# Patient Record
Sex: FEMALE | Race: WHITE | NOT HISPANIC OR LATINO | Employment: FULL TIME | URBAN - METROPOLITAN AREA
[De-identification: names, ages, dates, MRNs, and addresses within clinical notes are randomized per-mention and may not be internally consistent; named-entity substitution may affect disease eponyms.]

---

## 2024-05-17 ENCOUNTER — HOSPITAL ENCOUNTER (OUTPATIENT)
Dept: RADIOLOGY | Facility: CLINIC | Age: 59
Discharge: HOME | End: 2024-05-17
Payer: COMMERCIAL

## 2024-05-17 DIAGNOSIS — F17.210 NICOTINE DEPENDENCE, CIGARETTES, UNCOMPLICATED: ICD-10-CM

## 2024-05-17 PROCEDURE — 71271 CT THORAX LUNG CANCER SCR C-: CPT

## 2024-05-28 ENCOUNTER — TELEPHONE (OUTPATIENT)
Dept: GASTROENTEROLOGY | Facility: CLINIC | Age: 59
End: 2024-05-28
Payer: COMMERCIAL

## 2024-05-28 NOTE — TELEPHONE ENCOUNTER
OA [facility]ON 6/17/24  ENDO  DOCTOR KASANDRA SHAH PREP  PACKET MAILED AND SENT VIA [facility] 5/28/24

## 2024-05-28 NOTE — TELEPHONE ENCOUNTER
----- Message from Saundra Lees MA sent at 5/28/2024 10:42 AM EDT -----  Regarding: RE: OPEN ACCESS  Left message on machine to return call     ----- Message -----  From: Ramandeep Zhang RN  Sent: 5/24/2024   2:26 PM EDT  To: [id number]er[facility]  Subject: OPEN ACCESS                                      [facility]

## 2024-06-17 ENCOUNTER — ANESTHESIA (OUTPATIENT)
Dept: GASTROENTEROLOGY | Facility: HOSPITAL | Age: 59
End: 2024-06-17
Payer: COMMERCIAL

## 2024-06-17 ENCOUNTER — ANESTHESIA EVENT (OUTPATIENT)
Dept: GASTROENTEROLOGY | Facility: HOSPITAL | Age: 59
End: 2024-06-17
Payer: COMMERCIAL

## 2024-06-17 ENCOUNTER — HOSPITAL ENCOUNTER (OUTPATIENT)
Dept: GASTROENTEROLOGY | Facility: HOSPITAL | Age: 59
Discharge: HOME | End: 2024-06-17
Payer: COMMERCIAL

## 2024-06-17 VITALS
DIASTOLIC BLOOD PRESSURE: 84 MMHG | BODY MASS INDEX: 22.82 KG/M2 | SYSTOLIC BLOOD PRESSURE: 130 MMHG | WEIGHT: 137 LBS | TEMPERATURE: 97.5 F | HEIGHT: 65 IN | HEART RATE: 64 BPM | OXYGEN SATURATION: 97 % | RESPIRATION RATE: 16 BRPM

## 2024-06-17 DIAGNOSIS — Z12.11 SCREENING FOR COLON CANCER: ICD-10-CM

## 2024-06-17 PROBLEM — N20.1 LEFT URETERAL CALCULUS: Status: ACTIVE | Noted: 2024-06-17

## 2024-06-17 PROCEDURE — 7100000010 HC PHASE TWO TIME - EACH INCREMENTAL 1 MINUTE

## 2024-06-17 PROCEDURE — 2500000004 HC RX 250 GENERAL PHARMACY W/ HCPCS (ALT 636 FOR OP/ED): Performed by: NURSE ANESTHETIST, CERTIFIED REGISTERED

## 2024-06-17 PROCEDURE — G0121 COLON CA SCRN NOT HI RSK IND: HCPCS | Performed by: INTERNAL MEDICINE

## 2024-06-17 PROCEDURE — 3700000001 HC GENERAL ANESTHESIA TIME - INITIAL BASE CHARGE

## 2024-06-17 PROCEDURE — 7100000009 HC PHASE TWO TIME - INITIAL BASE CHARGE

## 2024-06-17 PROCEDURE — 3700000002 HC GENERAL ANESTHESIA TIME - EACH INCREMENTAL 1 MINUTE

## 2024-06-17 PROCEDURE — 45378 DIAGNOSTIC COLONOSCOPY: CPT | Performed by: INTERNAL MEDICINE

## 2024-06-17 PROCEDURE — 2500000005 HC RX 250 GENERAL PHARMACY W/O HCPCS: Performed by: NURSE ANESTHETIST, CERTIFIED REGISTERED

## 2024-06-17 PROCEDURE — 2500000004 HC RX 250 GENERAL PHARMACY W/ HCPCS (ALT 636 FOR OP/ED): Performed by: INTERNAL MEDICINE

## 2024-06-17 RX ORDER — CYANOCOBALAMIN (VITAMIN B-12) 250 MCG
250 TABLET ORAL
COMMUNITY
Start: 2024-06-03

## 2024-06-17 RX ORDER — FERROUS SULFATE 325(65) MG
325 TABLET ORAL
COMMUNITY
Start: 2024-06-03

## 2024-06-17 RX ORDER — SODIUM CHLORIDE 9 MG/ML
20 INJECTION, SOLUTION INTRAVENOUS CONTINUOUS
Status: DISCONTINUED | OUTPATIENT
Start: 2024-06-17 | End: 2024-06-18 | Stop reason: HOSPADM

## 2024-06-17 RX ORDER — POLYETHYLENE GLYCOL 3350 17 G/17G
17 POWDER, FOR SOLUTION ORAL DAILY
Qty: 60 PACKET | Refills: 3 | Status: SHIPPED | OUTPATIENT
Start: 2024-06-17

## 2024-06-17 RX ORDER — ATORVASTATIN CALCIUM 20 MG/1
20 TABLET, FILM COATED ORAL DAILY
COMMUNITY

## 2024-06-17 RX ORDER — PROPOFOL 10 MG/ML
INJECTION, EMULSION INTRAVENOUS AS NEEDED
Status: DISCONTINUED | OUTPATIENT
Start: 2024-06-17 | End: 2024-06-17

## 2024-06-17 RX ORDER — VIT C/E/ZN/COPPR/LUTEIN/ZEAXAN 250MG-90MG
1000 CAPSULE ORAL
COMMUNITY
Start: 2024-06-03

## 2024-06-17 RX ORDER — ALBUTEROL SULFATE 90 UG/1
AEROSOL, METERED RESPIRATORY (INHALATION)
COMMUNITY
Start: 2023-12-18 | End: 2024-06-25 | Stop reason: WASHOUT

## 2024-06-17 RX ORDER — LIDOCAINE HYDROCHLORIDE 20 MG/ML
INJECTION, SOLUTION INFILTRATION; PERINEURAL AS NEEDED
Status: DISCONTINUED | OUTPATIENT
Start: 2024-06-17 | End: 2024-06-17

## 2024-06-17 SDOH — HEALTH STABILITY: MENTAL HEALTH: CURRENT SMOKER: 1

## 2024-06-17 ASSESSMENT — COLUMBIA-SUICIDE SEVERITY RATING SCALE - C-SSRS
1. IN THE PAST MONTH, HAVE YOU WISHED YOU WERE DEAD OR WISHED YOU COULD GO TO SLEEP AND NOT WAKE UP?: NO
2. HAVE YOU ACTUALLY HAD ANY THOUGHTS OF KILLING YOURSELF?: NO
6. HAVE YOU EVER DONE ANYTHING, STARTED TO DO ANYTHING, OR PREPARED TO DO ANYTHING TO END YOUR LIFE?: NO

## 2024-06-17 ASSESSMENT — PAIN SCALES - GENERAL
PAINLEVEL_OUTOF10: 0 - NO PAIN
PAIN_LEVEL: 0
PAINLEVEL_OUTOF10: 0 - NO PAIN

## 2024-06-17 ASSESSMENT — PAIN - FUNCTIONAL ASSESSMENT
PAIN_FUNCTIONAL_ASSESSMENT: 0-10

## 2024-06-17 NOTE — ANESTHESIA PREPROCEDURE EVALUATION
Patient: Talia Wilcox    Procedure Information       Date/Time: 06/17/24 1330    Scheduled providers: Crhistopher Aguilar DO    Procedure: COLONOSCOPY    Location: [facility]            Relevant Problems   Anesthesia (within normal limits)       Clinical information reviewed:   Tobacco  Allergies  Meds   Med Hx  Surg Hx  OB Status  Fam Hx  Soc   Hx        NPO Detail:  NPO/Void Status  Date of Last Liquid: 06/17/24  Time of Last Liquid: 0730  Date of Last Solid: 06/15/24  Time of Last Solid: 2100  Last Intake Type: Clear fluids         Physical Exam    Airway  Mallampati: II  TM distance: >3 FB  Neck ROM: full     Cardiovascular - normal exam     Dental   (+) upper dentures, lower dentures     Pulmonary - normal exam     Abdominal - normal exam             Anesthesia Plan    History of general anesthesia?: yes  History of complications of general anesthesia?: no    ASA 2     MAC     The patient is a current smoker.  Patient was previously instructed to abstain from smoking on day of procedure.  Patient smoked on day of procedure.    intravenous induction   Anesthetic plan and risks discussed with patient.    Plan discussed with CRNA.

## 2024-06-17 NOTE — ANESTHESIA POSTPROCEDURE EVALUATION
Patient: Talia Wilcox    Procedure Summary       Date: 06/17/24 Room / Location: [facility]    Anesthesia Start: 1305 Anesthesia Stop: 1339    Procedure: COLONOSCOPY Diagnosis: Screening for colon cancer    Scheduled Providers: Christopher Aguilar DO Responsible Provider: SIGIFREDO Phillip    Anesthesia Type: MAC ASA Status: 2            Anesthesia Type: MAC    Vitals Value Taken Time   /63 06/17/24 1337   Temp 36.1 °C (97 °F) 06/17/24 1337   Pulse 77 06/17/24 1337   Resp 16 06/17/24 1337   SpO2 98 % 06/17/24 1337       Anesthesia Post Evaluation    Patient location during evaluation: PACU  Patient participation: complete - patient participated  Level of consciousness: awake and alert  Pain score: 0  Pain management: adequate  Airway patency: patent  Cardiovascular status: acceptable and hemodynamically stable  Respiratory status: acceptable, spontaneous ventilation and nasal cannula  Hydration status: acceptable  Postoperative Nausea and Vomiting: none        There were no known notable events for this encounter.

## 2024-06-18 ENCOUNTER — APPOINTMENT (OUTPATIENT)
Dept: CARDIOLOGY | Facility: CLINIC | Age: 59
End: 2024-06-18
Payer: COMMERCIAL

## 2024-06-25 ENCOUNTER — APPOINTMENT (OUTPATIENT)
Dept: CARDIOLOGY | Facility: CLINIC | Age: 59
End: 2024-06-25
Payer: COMMERCIAL

## 2024-06-25 VITALS
BODY MASS INDEX: 23.07 KG/M2 | HEIGHT: 65 IN | WEIGHT: 138.5 LBS | DIASTOLIC BLOOD PRESSURE: 84 MMHG | SYSTOLIC BLOOD PRESSURE: 142 MMHG | OXYGEN SATURATION: 99 % | HEART RATE: 83 BPM

## 2024-06-25 DIAGNOSIS — I25.10 CORONARY ARTERY CALCIFICATION: ICD-10-CM

## 2024-06-25 DIAGNOSIS — R03.0 BLOOD PRESSURE ELEVATED WITHOUT HISTORY OF HTN: ICD-10-CM

## 2024-06-25 DIAGNOSIS — R00.2 PALPITATIONS: Primary | ICD-10-CM

## 2024-06-25 DIAGNOSIS — I25.84 CORONARY ARTERY CALCIFICATION: ICD-10-CM

## 2024-06-25 DIAGNOSIS — E78.2 MIXED HYPERLIPIDEMIA: ICD-10-CM

## 2024-06-25 PROBLEM — K50.90: Status: ACTIVE | Noted: 2024-06-24

## 2024-06-25 PROCEDURE — 93000 ELECTROCARDIOGRAM COMPLETE: CPT | Performed by: STUDENT IN AN ORGANIZED HEALTH CARE EDUCATION/TRAINING PROGRAM

## 2024-06-25 PROCEDURE — 99205 OFFICE O/P NEW HI 60 MIN: CPT | Performed by: STUDENT IN AN ORGANIZED HEALTH CARE EDUCATION/TRAINING PROGRAM

## 2024-06-25 RX ORDER — NAPROXEN SODIUM 220 MG/1
81 TABLET, FILM COATED ORAL DAILY
Qty: 30 TABLET | Refills: 11 | Status: SHIPPED | OUTPATIENT
Start: 2024-06-25 | End: 2025-06-25

## 2024-06-25 RX ORDER — ATORVASTATIN CALCIUM 40 MG/1
40 TABLET, FILM COATED ORAL DAILY
Qty: 30 TABLET | Refills: 11 | Status: SHIPPED | OUTPATIENT
Start: 2024-06-25 | End: 2025-06-25

## 2024-06-25 NOTE — PROGRESS NOTES
[facility] Card[facility] Clinic Note    Date: 06/25/24  Time: 11:40 PM    Subjective   Talia Wilcox is a 58 y.o. female who is referred to me for establishing care.  Patient has a history of hyperlipidemia, prediabetes, abnormal coronary artery calcification.  Patient is reporting some palpitations.  CT chest showed severe coronary artery calcification.  Patient was referred due to palpitation.  Patient had CT scan done for screening of lung cancer which showed severe coronary artery calcification.  Patient has no chest pain or dyspnea.  Patient denies any heart failure symptoms.  Her only complaint today is recurrence of intermittent palpitation which have been happening for last few months.  Palpation revealed last for few hours.  No aggravating or relieving factor.        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     Review of Systems:  Otherwise, limited cardiovascular review of systems is negative.        Medical History:   She has a past medical history of Crohn disease (Multi).  Surgical History:   History reviewed. No pertinent surgical history.PSHP@  Social History:   Social Determinants of Health with Concerns     Tobacco Use: High Risk (6/25/2024)    Patient History     Smoking Tobacco Use: Every Day     Smokeless Tobacco Use: Never     Passive Exposure: Not on file   Alcohol Use: Not on file   Financial Resource Strain: Not on file   Food Insecurity: Not on file   Transportation Needs: Not on file   Physical Activity: Not on file   Stress: Not on file   Social Connections: Not on file   Intimate Partner Violence: Not on file   Depression: Medium Risk (4/19/2024)    Received from [facility]    Depression     PHQ-9 Total Score (Interpretation of Total Score 1-4 = Minimal depression; 5-9 = Mild depression; 10-14 =  "Moderate depression; 15-19 = Moderately severe depression): 11   Housing Stability: Not on file   Utilities: Not on file   Digital Equity: Not on file   Health Literacy: Not on file     Family History:   Family History   Problem Relation Name Age of Onset    Heart attack Father Richard Givens       Allergies:  Ciprofloxacin    Outpatient Medications:  Current Outpatient Medications   Medication Instructions    aspirin 81 mg, oral, Daily    atorvastatin (LIPITOR) 40 mg, oral, Daily    cholecalciferol (VITAMIN D-3) 1,000 Units, oral, Daily RT    cyanocobalamin (VITAMIN B-12) 250 mcg, oral, Daily RT    ferrous sulfate (325 mg ferrous sulfate) 325 mg, oral, Daily RT    polyethylene glycol (GLYCOLAX, MIRALAX) 17 g, oral, Daily       Objective     Physical Exam  Vitals:    06/25/24 1259   BP: 142/84   BP Location: Left arm   Patient Position: Sitting   BP Cuff Size: Adult   Pulse: 83   SpO2: 99%   Weight: 62.8 kg (138 lb 8 oz)   Height: 1.651 m (5' 5\")     Wt Readings from Last 3 Encounters:   06/25/24 62.8 kg (138 lb 8 oz)   06/17/24 62.1 kg (137 lb)       General: Alert and Oriented, No distress, cooperative  Head: Normocephalic without obvious abnormality, atraumatic  Eyes: Conjunctiva/corneas clear, EOM's grossly intact  Neck: Supple, trachea midline, No thyroid enlargement/tenderness/nodules; No JVD  Lungs: Clear to auscultation bilaterally, no wheezes, rhonci, or rales. respirations unlabored  Chest Wall: No tenderness or deformity  Heart: Regular rhythm, normal S1/S2, no murmur  Abdomen: Soft, non-tender, Non-distended, bowel sounds active  Extremities: No edema, no cyanosis, no clubbing  Skin: Skin color, texture, turgor normal.  No rashes or lesions noted  Neurologic: Alert and oriented x 3, grossly moving all extremities, speech intact        I have personally reviewed the following images and laboratory findings:  ECG: normal sinus rhythm, no blocks or conduction defects, no ischemic " changes    INDICATION:  Signs/Symptoms:..      COMPARISON:  12/16/2015      ACCESSION NUMBER(S):  [id number]      ORDERING CLINICIAN:  JEFF MCNEAL      TECHNIQUE:  Helical data acquisition of the chest was obtained without IV  contrast material.  Images were reformatted in axial, coronal, and  sagittal planes.      FINDINGS:  LUNGS AND AIRWAYS:  The trachea and central airways are patent. No endobronchial lesion  is seen.      There is mild paraseptal emphysematous changes.  There is prominent biapical pleural thickening with nodularity.  There is no focal consolidation, pleural effusion, or pneumothorax.      There is a 7 mm nodular density in the left upper lobe with adjacent  ground-glass opacities. 3 mm subpleural right apical parenchymal lung  nodule. There is a 3 mm subpleural left upper lobe parenchymal lung  nodule. There is a 2 mm subpleural right lower lobe parenchymal lung  nodule. Punctate 2 mm right middle lobe parenchymal lung nodules.                  MEDIASTINUM AND ANTWAN, LOWER NECK AND AXILLA:  The visualized thyroid gland is within normal limits.  No evidence of thoracic lymphadenopathy by CT criteria.  Esophagus appears within normal limits as seen.      HEART AND VESSELS:  The thoracic aorta normal in course and caliber.  Main pulmonary artery and its branches are normal in caliber.  There are severe coronary artery calcifications. Estimated CT calcium  score: 583. The cardiac chambers are not enlarged.  There is no pericardial effusion seen.      UPPER ABDOMEN:  Patient is status post cholecystectomy.              CHEST WALL AND OSSEOUS STRUCTURES:  Chest wall is within normal limits.  No acute osseous pathology.There are no suspicious osseous lesions.      IMPRESSION:  1. There are several subcentimeter parenchymal lung nodules most  likely benign in nature.  2. Recommend a six-month low-dose chest CT confirm the benign nature  of these subcentimeter nodules.  3. Severe coronary artery  calcifications and correlate with coronary  artery disease risk factors.      Echocardiogram: not done    Laboratory values:   No visits with results within 2 Month(s) from this visit.   Latest known visit with results is:   Legacy Encounter on 02/05/2022   Component Date Value    WBC, Urine 02/05/2022 <1     RBC, Urine 02/05/2022 2     Glucose 02/06/2022 120 (H)     Sodium 02/06/2022 137     Potassium 02/06/2022 3.8     Chloride 02/06/2022 104     Bicarbonate 02/06/2022 26     Anion Gap 02/06/2022 11     Urea Nitrogen 02/06/2022 10     Creatinine 02/06/2022 0.97     GFR Female 02/06/2022 68     Calcium 02/06/2022 8.8     Phosphorus 02/06/2022 4.1     Albumin 02/06/2022 3.7     Glucose 02/05/2022 151 (H)     Sodium 02/05/2022 137     Potassium 02/05/2022 3.6     Chloride 02/05/2022 104     Bicarbonate 02/05/2022 24     Anion Gap 02/05/2022 13     Urea Nitrogen 02/05/2022 11     Creatinine 02/05/2022 0.91     GFR Female 02/05/2022 74     Calcium 02/05/2022 9.5     WBC 02/05/2022 12.1 (H)     RBC 02/05/2022 4.36     Hemoglobin 02/05/2022 13.4     Hematocrit 02/05/2022 40.5     MCV 02/05/2022 93     MCHC 02/05/2022 33.1     Platelets 02/05/2022 279     RDW 02/05/2022 14.8 (H)     Neutrophils % 02/05/2022 85.5     Immature Granulocytes %,* 02/05/2022 0.2     Lymphocytes % 02/05/2022 11.1     Monocytes % 02/05/2022 2.7     Eosinophils % 02/05/2022 0.2     Basophils % 02/05/2022 0.3     Neutrophils Absolute 02/05/2022 10.31 (H)     Lymphocytes Absolute 02/05/2022 1.34     Monocytes Absolute 02/05/2022 0.33     Eosinophils Absolute 02/05/2022 0.02     Basophils Absolute 02/05/2022 0.04     WBC 02/06/2022 10.1     RBC 02/06/2022 3.80 (L)     Hemoglobin 02/06/2022 11.9 (L)     Hematocrit 02/06/2022 35.7 (L)     MCV 02/06/2022 94     MCHC 02/06/2022 33.3     Platelets 02/06/2022 244     RDW 02/06/2022 14.9 (H)     Color, Urine 02/05/2022 Yellow     Appearance, Urine 02/05/2022 Clear     Specific Gravity, Urine 02/05/2022  "1.036 (H)     pH, Urine 02/05/2022 6.0     Protein, Urine 02/05/2022 Negative     Glucose, Urine 02/05/2022 Negative     Blood, Urine 02/05/2022 SMALL(1+) (A)     Ketones, Urine 02/05/2022 20(1+) (A)     Bilirubin, Urine 02/05/2022 Negative     Urobilinogen, Urine 02/05/2022 <2.0     Nitrite, Urine 02/05/2022 Negative     Leukocyte Esterase, Urine 02/05/2022 Negative     Lipase 02/05/2022 27     Magnesium 02/06/2022 1.68     Albumin 02/05/2022 4.3     Total Bilirubin 02/05/2022 0.3     Bilirubin, Direct 02/05/2022 0.1     Alkaline Phosphatase 02/05/2022 83     ALT (SGPT) 02/05/2022 16     AST 02/05/2022 16     Total Protein 02/05/2022 7.3     SARS-CoV-2 Result 02/05/2022 NOT DETECTED     Sedimentation Rate 02/05/2022 28     CRP 02/05/2022 0.30      CBC -  Lab Results   Component Value Date    WBC 10.1 02/06/2022    HGB 11.9 (L) 02/06/2022    HCT 35.7 (L) 02/06/2022    MCV 94 02/06/2022     02/06/2022       CMP -  Lab Results   Component Value Date    CALCIUM 8.8 02/06/2022    PHOS 4.1 02/06/2022    PROT 7.3 02/05/2022    ALBUMIN 3.7 02/06/2022    AST 16 02/05/2022    ALT 16 02/05/2022    ALKPHOS 83 02/05/2022    BILITOT 0.3 02/05/2022       LIPID PANEL -   No results found for: \"CHOL\", \"HDL\", \"CHHDL\", \"LDL\", \"VLDL\", \"TRIG\", \"NHDL\"    RENAL FUNCTION PANEL -   Lab Results   Component Value Date    K 3.8 02/06/2022    PHOS 4.1 02/06/2022     The ASCVD Risk score (Luther DK, et al., 2019) failed to calculate for the following reasons:    Cannot find a previous HDL lab    Cannot find a previous total cholesterol lab    No results found for: \"BNP\", \"HGBA1C\"      Assessment/Plan   Palpitation  Hyperlipidemia  Abnormal coronary artery calcification    Plan:  -I will obtain transthoracic echo to rule out any structural abnormalities.  -I will also obtain Holter monitor to evaluate for malignant arrhythmia.  -I will start on aspirin 81 mg daily and recommend high intensity statin therapy.  -Withhold on stress testing " given patient is completely asymptomatic.  RTC as scheduled.    In addition, the following orders were placed today:  Orders Placed This Encounter   Procedures    Holter Or Event Cardiac Monitor    ECG 12 Lead    Transthoracic Echo (TTE) Complete                 SIGNATURE: Reji Hopper MD PATIENT NAME: Talia Wilcox   DATE/TIME: June 25, 2024 11:40 PM MRN: [id number]

## 2024-07-16 ENCOUNTER — APPOINTMENT (OUTPATIENT)
Dept: CARDIOLOGY | Facility: HOSPITAL | Age: 59
End: 2024-07-16
Payer: COMMERCIAL

## 2024-07-23 ENCOUNTER — HOSPITAL ENCOUNTER (OUTPATIENT)
Dept: CARDIOLOGY | Facility: HOSPITAL | Age: 59
Discharge: HOME | End: 2024-07-23
Payer: COMMERCIAL

## 2024-07-23 ENCOUNTER — APPOINTMENT (OUTPATIENT)
Dept: CARDIOLOGY | Facility: CLINIC | Age: 59
End: 2024-07-23
Payer: COMMERCIAL

## 2024-07-23 DIAGNOSIS — I25.10 CORONARY ARTERY CALCIFICATION: ICD-10-CM

## 2024-07-23 DIAGNOSIS — I25.84 CORONARY ARTERY CALCIFICATION: ICD-10-CM

## 2024-07-23 DIAGNOSIS — R00.2 PALPITATIONS: ICD-10-CM

## 2024-07-23 DIAGNOSIS — I10 ESSENTIAL (PRIMARY) HYPERTENSION: ICD-10-CM

## 2024-07-23 LAB
AORTIC VALVE MEAN GRADIENT: 1.9 MMHG
AORTIC VALVE PEAK VELOCITY: 0.92 M/S
AV PEAK GRADIENT: 3.4 MMHG
AVA (PEAK VEL): 3.14 CM2
AVA (VTI): 3.1 CM2
EJECTION FRACTION: 56 %
GLOBAL LONGITUDINAL STRAIN: 18.2 %
LEFT ATRIUM VOLUME AREA LENGTH INDEX BSA: 12.5 ML/M2
LEFT VENTRICLE INTERNAL DIMENSION DIASTOLE: 3.99 CM (ref 3.5–6)
LEFT VENTRICULAR OUTFLOW TRACT DIAMETER: 1.99 CM
MITRAL VALVE E/A RATIO: 0.87
MITRAL VALVE E/E' RATIO: 7.57
RIGHT VENTRICLE FREE WALL PEAK S': 12.53 CM/S
TRICUSPID ANNULAR PLANE SYSTOLIC EXCURSION: 2 CM

## 2024-07-23 PROCEDURE — 93306 TTE W/DOPPLER COMPLETE: CPT | Performed by: INTERNAL MEDICINE

## 2024-07-23 PROCEDURE — 93306 TTE W/DOPPLER COMPLETE: CPT

## 2024-07-23 PROCEDURE — 93246 EXT ECG>7D<15D RECORDING: CPT | Performed by: STUDENT IN AN ORGANIZED HEALTH CARE EDUCATION/TRAINING PROGRAM

## 2024-07-26 ENCOUNTER — TELEPHONE (OUTPATIENT)
Dept: CARDIOLOGY | Facility: CLINIC | Age: 59
End: 2024-07-26
Payer: COMMERCIAL

## 2024-07-26 NOTE — TELEPHONE ENCOUNTER
7/26/24  1310  Called patient; no answer. Left brief voice message informing of echocardiogram results, to keep follow up with Dr. Hopper and to call if questions.      ----- Message from Reji Hopper sent at 7/23/2024  5:17 PM EDT -----  routine  ----- Message -----  From: Jens Syngo - Cardiology Results In  Sent: 7/23/2024   4:15 PM EDT  To: Reji Hopper MD

## 2024-07-26 NOTE — TELEPHONE ENCOUNTER
7/26/24  0438  Patient called back in for results.      Echocardiogram results given to patient with patient verbalizing understanding.    Patient did ask if the echocardiogram showed any calcification of the coronary arteries; she was informed she had coronary calcification on a chest x-ray and does have intermittent chest pain at work when standing.    Informed patient that the echocardiogram does not do that but that a stress test or a CT angio will help to determine calcification.  Patient reported to have Holter monitor on at this time and scheduled a follow up with Dr. Hopper.    Informed patient that depending on her insurance that Dr. Hopper may order either a CT angio or a stress test to determine if there is ischemia.    Patient verbalized understanding.

## 2024-08-14 ENCOUNTER — ANCILLARY PROCEDURE (OUTPATIENT)
Dept: CARDIOLOGY | Facility: HOSPITAL | Age: 59
End: 2024-08-14
Payer: COMMERCIAL

## 2024-08-14 DIAGNOSIS — R00.2 PALPITATIONS: ICD-10-CM

## 2024-08-14 PROCEDURE — 93248 EXT ECG>7D<15D REV&INTERPJ: CPT | Performed by: STUDENT IN AN ORGANIZED HEALTH CARE EDUCATION/TRAINING PROGRAM

## 2024-08-14 PROCEDURE — 93246 EXT ECG>7D<15D RECORDING: CPT

## 2024-08-19 ENCOUNTER — TELEPHONE (OUTPATIENT)
Dept: CARDIOLOGY | Facility: HOSPITAL | Age: 59
End: 2024-08-19
Payer: COMMERCIAL

## 2024-08-19 NOTE — TELEPHONE ENCOUNTER
18/19/24  1014  Patient returned call. Informed patient of of Holter results and that Dr. Hopper would discuss treatment options at follow up this week.    Patient verbalized understanding    8/19/24  1008  Called patient; no answer. Left voice message for patient to return call for Holter results.      ----- Message from Reji Hopper sent at 8/17/2024  9:06 PM EDT -----  Patient had svt which is symatic. F/up with me I see schedule on 8/22 which is okay  ----- Message -----  From: Rasheeda Juan  Sent: 8/15/2024   3:43 PM EDT  To: Reji Hopper MD

## 2024-08-21 ENCOUNTER — TELEPHONE (OUTPATIENT)
Dept: CARDIOLOGY | Facility: HOSPITAL | Age: 59
End: 2024-08-21
Payer: COMMERCIAL

## 2024-08-22 ENCOUNTER — APPOINTMENT (OUTPATIENT)
Dept: CARDIOLOGY | Facility: CLINIC | Age: 59
End: 2024-08-22
Payer: COMMERCIAL

## 2024-08-22 VITALS
HEART RATE: 87 BPM | SYSTOLIC BLOOD PRESSURE: 122 MMHG | WEIGHT: 138.4 LBS | HEIGHT: 65 IN | OXYGEN SATURATION: 97 % | BODY MASS INDEX: 23.06 KG/M2 | DIASTOLIC BLOOD PRESSURE: 62 MMHG

## 2024-08-22 DIAGNOSIS — I47.10 SVT (SUPRAVENTRICULAR TACHYCARDIA) (CMS-HCC): Primary | ICD-10-CM

## 2024-08-22 DIAGNOSIS — I25.10 CORONARY ARTERY CALCIFICATION: ICD-10-CM

## 2024-08-22 DIAGNOSIS — I20.89 ATYPICAL ANGINA (CMS-HCC): ICD-10-CM

## 2024-08-22 DIAGNOSIS — I10 ESSENTIAL (PRIMARY) HYPERTENSION: ICD-10-CM

## 2024-08-22 DIAGNOSIS — I25.84 CORONARY ARTERY CALCIFICATION: ICD-10-CM

## 2024-08-22 DIAGNOSIS — E78.2 MIXED HYPERLIPIDEMIA: ICD-10-CM

## 2024-08-22 PROCEDURE — 99215 OFFICE O/P EST HI 40 MIN: CPT | Performed by: STUDENT IN AN ORGANIZED HEALTH CARE EDUCATION/TRAINING PROGRAM

## 2024-08-22 PROCEDURE — 3074F SYST BP LT 130 MM HG: CPT | Performed by: STUDENT IN AN ORGANIZED HEALTH CARE EDUCATION/TRAINING PROGRAM

## 2024-08-22 PROCEDURE — 3078F DIAST BP <80 MM HG: CPT | Performed by: STUDENT IN AN ORGANIZED HEALTH CARE EDUCATION/TRAINING PROGRAM

## 2024-08-22 PROCEDURE — 3008F BODY MASS INDEX DOCD: CPT | Performed by: STUDENT IN AN ORGANIZED HEALTH CARE EDUCATION/TRAINING PROGRAM

## 2024-08-22 RX ORDER — NAPROXEN SODIUM 220 MG/1
81 TABLET, FILM COATED ORAL DAILY
Qty: 30 TABLET | Refills: 11 | Status: SHIPPED | OUTPATIENT
Start: 2024-08-22 | End: 2025-08-22

## 2024-08-22 RX ORDER — ATORVASTATIN CALCIUM 40 MG/1
40 TABLET, FILM COATED ORAL DAILY
Qty: 30 TABLET | Refills: 11 | Status: SHIPPED | OUTPATIENT
Start: 2024-08-22 | End: 2025-08-22

## 2024-08-22 RX ORDER — METOPROLOL TARTRATE 25 MG/1
25 TABLET, FILM COATED ORAL 2 TIMES DAILY
Qty: 60 TABLET | Refills: 11 | Status: SHIPPED | OUTPATIENT
Start: 2024-08-22 | End: 2025-08-22

## 2024-08-22 NOTE — PROGRESS NOTES
[facility] Clinic Note    Date: 08/22/24  Time: 4:55 PM    Subjective   Talia Wilcox is a 58 y.o. female who is coming to clinic for follow-up visit..  Patient has a history of hyperlipidemia, prediabetes, abnormal coronary artery calcification.  Patient is reporting some palpitations.  CT chest showed severe coronary artery calcification.  Patient was referred due to palpitation.  Patient had CT scan done for screening of lung cancer which showed severe coronary artery calcification. Patient denies any heart failure symptoms.  She has been having palpitations.  She underwent Holter monitoring which showed some SVT and supraventricular ectopy some of which corresponded with patient's symptoms.     She also endorses some chest tightness.  She continues to smoke.  She has not been worked up for COPD.  CT scan did show emphysema.  Does not follow-up with pulmonary.      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        Review of Systems:  Otherwise, limited cardiovascular review of systems is negative.        Medical History:   She has a past medical history of Crohn disease (Multi).  Surgical History:   No past surgical history on file.PSHP@  Social History:   Social Determinants of Health with Concerns     Tobacco Use: High Risk (8/22/2024)    Patient History     Smoking Tobacco Use: Every Day     Smokeless Tobacco Use: Never     Passive Exposure: Not on file   Alcohol Use: Not on file   Financial Resource Strain: Not on file   Food Insecurity: Not on file   Transportation Needs: Not on file   Physical Activity: Not on file   Stress: Not on file   Social Connections: Not on file   Intimate Partner Violence: Not on file   Depression: Medium Risk (4/19/2024)    Received from [facility]  [facility]    Depression     PHQ-9 Total Score (Interpretation of Total Score 1-4 = Minimal depression; 5-9 = Mild depression; 10-14 = Moderate depression; 15-19 = Moderately severe depression): 11   Housing Stability: Not on file   Utilities: Not on file   Digital Equity: Not on file   Health Literacy: Not on file     Family History:   Family History   Problem Relation Name Age of Onset    Heart attack Father Richard Givens       Allergies:  Ciprofloxacin    Outpatient Medications:  Current Outpatient Medications   Medication Instructions    aspirin 81 mg, oral, Daily    atorvastatin (LIPITOR) 40 mg, oral, Daily    cholecalciferol (VITAMIN D-3) 1,000 Units, oral, Daily RT    cyanocobalamin (VITAMIN B-12) 250 mcg, oral, Daily RT    metoprolol tartrate (LOPRESSOR) 25 mg, oral, 2 times daily       Objective     Physical Exam  Vitals:    08/22/24 1445   BP: 122/62   BP Location: Left arm   Patient Position: Sitting   BP Cuff Size: Adult   Pulse: 87   SpO2: 97%   Weight: 62.8 kg (138 lb 6.4 oz)   Height: 1.651 m (5' 5\")     Wt Readings from Last 3 Encounters:   08/22/24 62.8 kg (138 lb 6.4 oz)   06/25/24 62.8 kg (138 lb 8 oz)   06/17/24 62.1 kg (137 lb)       General: Alert and Oriented, No distress, cooperative  Head: Normocephalic without obvious abnormality, atraumatic  Eyes: Conjunctiva/corneas clear, EOM's grossly intact  Neck: Supple, trachea midline, No thyroid enlargement/tenderness/nodules; No JVD  Lungs: Clear to auscultation bilaterally, no wheezes, rhonci, or rales. respirations unlabored  Chest Wall: No tenderness or deformity  Heart: Regular rhythm, normal S1/S2, no murmur  Abdomen: Soft, non-tender, Non-distended, bowel sounds active  Extremities: No edema, no cyanosis, no clubbing  Skin: Skin color, texture, turgor normal.  No rashes or lesions noted  Neurologic: Alert and oriented x 3, grossly moving all extremities, speech intact      I have personally reviewed the following images and " laboratory findings:  ECG: normal sinus rhythm, no blocks or conduction defects, no ischemic changes     INDICATION:  Signs/Symptoms:..      COMPARISON:  12/16/2015      ACCESSION NUMBER(S):  [id number]      ORDERING CLINICIAN:  JEFF MCNEAL      TECHNIQUE:  Helical data acquisition of the chest was obtained without IV  contrast material.  Images were reformatted in axial, coronal, and  sagittal planes.      FINDINGS:  LUNGS AND AIRWAYS:  The trachea and central airways are patent. No endobronchial lesion  is seen.      There is mild paraseptal emphysematous changes.  There is prominent biapical pleural thickening with nodularity.  There is no focal consolidation, pleural effusion, or pneumothorax.      There is a 7 mm nodular density in the left upper lobe with adjacent  ground-glass opacities. 3 mm subpleural right apical parenchymal lung  nodule. There is a 3 mm subpleural left upper lobe parenchymal lung  nodule. There is a 2 mm subpleural right lower lobe parenchymal lung  nodule. Punctate 2 mm right middle lobe parenchymal lung nodules.                  MEDIASTINUM AND ANTWAN, LOWER NECK AND AXILLA:  The visualized thyroid gland is within normal limits.  No evidence of thoracic lymphadenopathy by CT criteria.  Esophagus appears within normal limits as seen.      HEART AND VESSELS:  The thoracic aorta normal in course and caliber.  Main pulmonary artery and its branches are normal in caliber.  There are severe coronary artery calcifications. Estimated CT calcium  score: 583. The cardiac chambers are not enlarged.  There is no pericardial effusion seen.      UPPER ABDOMEN:  Patient is status post cholecystectomy.              CHEST WALL AND OSSEOUS STRUCTURES:  Chest wall is within normal limits.  No acute osseous pathology.There are no suspicious osseous lesions.      IMPRESSION:  1. There are several subcentimeter parenchymal lung nodules most  likely benign in nature.  2. Recommend a six-month low-dose  chest CT confirm the benign nature  of these subcentimeter nodules.  3. Severe coronary artery calcifications and correlate with coronary  artery disease risk factors.  TRANSTHORACIC ECHOCARDIOGRAM REPORT     Patient Name:      ABRAHAMCHAR Alcantar Physician:    [id number] Kim Nelson MD  Study Date:        7/23/2024            Ordering Provider:    [id number] YULIANA MITCHELL  MRN/PID:           [id number]             Fellow:  Accession#:        [id number]         Nurse:  Date of Birth/Age: 1965 / 58      Sonographer:          Chasity Gonzalez RDCS                     years  Gender:            F                    Additional Staff:     Jolie Luna  Height:            165.10 cm            Admit Date:           7/23/2024  Weight:            62.14 kg             Admission Status:     Outpatient  BSA / BMI:         1.68 m2 / 22.80      Department Location:  [facility] Echo                     kg/m2                                      Lab  Blood Pressure: 142 /84 mmHg     Study Type:    TRANSTHORACIC ECHO (TTE) COMPLETE  Diagnosis/ICD: Essential (primary) hypertension-I10  Indication:    Hypertension  CPT Codes:     Echo Complete w Full [id number]   Study Detail: The following Echo studies were performed: 2D, M-Mode, Doppler,                color flow and Strain.        PHYSICIAN INTERPRETATION:  Left Ventricle: Left ventricular ejection fraction is normal, with a calculated ejection fraction of 56% by auto EF. There are no regional wall motion abnormalities. The left ventricular cavity size is normal. The left ventricular septal wall thickness is normal. Left Ventricular Global Longitudinal Strain - 18.2 %. Spectral Doppler shows a normal pattern of left ventricular diastolic filling.  Left Atrium: The left atrium is normal in size.  Right Ventricle: The right ventricle is normal in  size. There is normal right ventricular global systolic function.  Right Atrium: The right atrium is normal in size.  Aortic Valve: The aortic valve appears structurally normal. The aortic valve dimensionless index is 1.00. There is no evidence of aortic valve regurgitation. The peak instantaneous gradient of the aortic valve is 3.4 mmHg. The mean gradient of the aortic valve is 1.9 mmHg.  Mitral Valve: The mitral valve is normal in structure. There is no evidence of mitral valve regurgitation.  Tricuspid Valve: The tricuspid valve is structurally normal. No evidence of tricuspid regurgitation.  Pulmonic Valve: The pulmonic valve is structurally normal. There is no indication of pulmonic valve regurgitation.  Pericardium: There is no pericardial effusion noted.  Aorta: The aortic root is normal.        CONCLUSIONS:   1. Left ventricular ejection fraction is normal, with a calculated ejection fraction of 56% by auto EF.   2. There is normal right ventricular global systolic function.     [place]      Laboratory values:   Hospital Outpatient Visit on 07/23/2024   Component Date Value    LVOT diam 07/23/2024 1.99     MV E/A ratio 07/23/2024 0.87     MV avg E/e' ratio 07/23/2024 7.57     Tricuspid annular plane * 07/23/2024 2.0     AV mn grad 07/23/2024 1.9     LA vol index A/L 07/23/2024 12.5     AV pk conner 07/23/2024 0.92     LV EF 07/23/2024 56     RV free wall pk S' 07/23/2024 12.53     LV GLS 07/23/2024 18.2     LVIDd 07/23/2024 3.99     Aortic Valve Area by Con* 07/23/2024 3.10     Aortic Valve Area by Con* 07/23/2024 3.14     AV pk grad 07/23/2024 3.4      CBC -  Lab Results   Component Value Date    WBC 10.1 02/06/2022    HGB 11.9 (L) 02/06/2022    HCT 35.7 (L) 02/06/2022    MCV 94 02/06/2022     02/06/2022       CMP -  Lab Results   Component Value Date    CALCIUM 8.8 02/06/2022    PHOS 4.1 02/06/2022    PROT 7.3 02/05/2022    ALBUMIN 3.7 02/06/2022    AST 16 02/05/2022    ALT 16 02/05/2022    ALKPHOS 83  "02/05/2022    BILITOT 0.3 02/05/2022       LIPID PANEL -   No results found for: \"CHOL\", \"HDL\", \"CHHDL\", \"LDL\", \"VLDL\", \"TRIG\", \"NHDL\"    RENAL FUNCTION PANEL -   Lab Results   Component Value Date    K 3.8 02/06/2022    PHOS 4.1 02/06/2022       No results found for: \"BNP\", \"HGBA1C\"     Assessment/Plan   Palpitation/supraventricular tachycardia/SVE  Hyperlipidemia  Abnormal coronary artery calcification  Atypical angina     Plan:  -I reviewed the results of echocardiogram with the patient.  No major structural abnormalities.  -I reviewed the results of Holter monitor.  No malignant arrhythmia.  Patient did had supraventricular ectopy and SVT some of which corresponded with patient's symptoms.  -I will continue on aspirin 81 mg daily and  high intensity statin therapy.  -Given chest tightness/atypical angina and severe coronary artery calcification we will obtain a stress test to evaluate for ischemia.  -I will start on low-dose metoprolol for SVT and antianginal effect.  -If cardiac workup is unremarkable then I will refer to pulmonary for evaluation.    RTC as scheduled    In addition, the following orders were placed today:  No orders of the defined types were placed in this encounter.                SIGNATURE: Reji Hopper MD PATIENT NAME: Talia Wilcox   DATE/TIME: August 22, 2024 4:55 PM MRN: [id number]                             "

## 2024-09-05 ENCOUNTER — HOSPITAL ENCOUNTER (OUTPATIENT)
Dept: RADIOLOGY | Facility: HOSPITAL | Age: 59
Discharge: HOME | End: 2024-09-05
Payer: COMMERCIAL

## 2024-09-05 ENCOUNTER — HOSPITAL ENCOUNTER (OUTPATIENT)
Dept: CARDIOLOGY | Facility: HOSPITAL | Age: 59
Discharge: HOME | End: 2024-09-05
Payer: COMMERCIAL

## 2024-09-05 DIAGNOSIS — I25.10 CORONARY ARTERY CALCIFICATION: ICD-10-CM

## 2024-09-05 DIAGNOSIS — I20.89 ATYPICAL ANGINA (CMS-HCC): ICD-10-CM

## 2024-09-05 DIAGNOSIS — I25.84 CORONARY ARTERY CALCIFICATION: ICD-10-CM

## 2024-09-05 PROCEDURE — 78452 HT MUSCLE IMAGE SPECT MULT: CPT | Performed by: INTERNAL MEDICINE

## 2024-09-05 PROCEDURE — 93018 CV STRESS TEST I&R ONLY: CPT | Performed by: INTERNAL MEDICINE

## 2024-09-05 PROCEDURE — 78452 HT MUSCLE IMAGE SPECT MULT: CPT

## 2024-09-05 PROCEDURE — 93017 CV STRESS TEST TRACING ONLY: CPT

## 2024-09-05 PROCEDURE — A9502 TC99M TETROFOSMIN: HCPCS | Performed by: INTERNAL MEDICINE

## 2024-09-05 PROCEDURE — 3430000001 HC RX 343 DIAGNOSTIC RADIOPHARMACEUTICALS: Performed by: INTERNAL MEDICINE

## 2024-09-05 PROCEDURE — 93016 CV STRESS TEST SUPVJ ONLY: CPT | Performed by: INTERNAL MEDICINE

## 2024-09-06 ENCOUNTER — TELEPHONE (OUTPATIENT)
Dept: CARDIOLOGY | Facility: HOSPITAL | Age: 59
End: 2024-09-06
Payer: COMMERCIAL

## 2024-09-06 NOTE — TELEPHONE ENCOUNTER
9/6/24  0941  Called results and follow up directive to patient with patient verbalizing understanding.    ----- Message from Reji Hopper sent at 9/5/2024  5:47 PM EDT -----  Routine  ----- Message -----  From: Fidel Colindres - Cardiology Results In  Sent: 9/5/2024  10:37 AM EDT  To: Reji Hopper MD

## 2024-09-06 NOTE — TELEPHONE ENCOUNTER
9/6/24  0940  Called stress test results and follow up directive to patient with patient verbalizing understanding.      ----- Message from Reji Hopper sent at 9/5/2024  5:46 PM EDT -----  Routine, no significant issues  ----- Message -----  From: Interface, Radiology Results In  Sent: 9/5/2024   4:03 PM EDT  To: Reji Hopper MD

## 2024-10-28 ENCOUNTER — OFFICE VISIT (OUTPATIENT)
Dept: PULMONOLOGY | Facility: HOSPITAL | Age: 59
End: 2024-10-28
Payer: COMMERCIAL

## 2024-10-28 ENCOUNTER — LAB (OUTPATIENT)
Dept: LAB | Facility: LAB | Age: 59
End: 2024-10-28
Payer: COMMERCIAL

## 2024-10-28 VITALS
HEART RATE: 101 BPM | OXYGEN SATURATION: 97 % | WEIGHT: 142.2 LBS | HEIGHT: 65 IN | TEMPERATURE: 100.4 F | BODY MASS INDEX: 23.69 KG/M2 | DIASTOLIC BLOOD PRESSURE: 79 MMHG | SYSTOLIC BLOOD PRESSURE: 129 MMHG | RESPIRATION RATE: 16 BRPM

## 2024-10-28 DIAGNOSIS — J44.9 CHRONIC OBSTRUCTIVE PULMONARY DISEASE, UNSPECIFIED COPD TYPE (MULTI): ICD-10-CM

## 2024-10-28 DIAGNOSIS — J06.9 UPPER RESPIRATORY TRACT INFECTION, UNSPECIFIED TYPE: ICD-10-CM

## 2024-10-28 DIAGNOSIS — J30.9 ALLERGIC RHINITIS, UNSPECIFIED SEASONALITY, UNSPECIFIED TRIGGER: ICD-10-CM

## 2024-10-28 DIAGNOSIS — F17.210 CIGARETTE SMOKER: Primary | ICD-10-CM

## 2024-10-28 PROCEDURE — 3008F BODY MASS INDEX DOCD: CPT | Performed by: NURSE PRACTITIONER

## 2024-10-28 PROCEDURE — 36415 COLL VENOUS BLD VENIPUNCTURE: CPT

## 2024-10-28 PROCEDURE — 82104 ALPHA-1-ANTITRYPSIN PHENO: CPT

## 2024-10-28 PROCEDURE — 86003 ALLG SPEC IGE CRUDE XTRC EA: CPT

## 2024-10-28 PROCEDURE — 99203 OFFICE O/P NEW LOW 30 MIN: CPT | Performed by: NURSE PRACTITIONER

## 2024-10-28 PROCEDURE — 82785 ASSAY OF IGE: CPT

## 2024-10-28 PROCEDURE — 99213 OFFICE O/P EST LOW 20 MIN: CPT | Performed by: NURSE PRACTITIONER

## 2024-10-28 RX ORDER — DOXYCYCLINE 100 MG/1
100 CAPSULE ORAL 2 TIMES DAILY
Qty: 14 CAPSULE | Refills: 0 | Status: SHIPPED | OUTPATIENT
Start: 2024-10-28 | End: 2024-11-04

## 2024-10-28 RX ORDER — PREDNISONE 10 MG/1
TABLET ORAL
Qty: 40 TABLET | Refills: 0 | Status: SHIPPED | OUTPATIENT
Start: 2024-10-28 | End: 2024-11-13

## 2024-10-28 RX ORDER — LORATADINE 10 MG/1
10 TABLET ORAL DAILY
Qty: 30 TABLET | Refills: 11 | Status: SHIPPED | OUTPATIENT
Start: 2024-10-28

## 2024-10-28 RX ORDER — AZELASTINE 1 MG/ML
1 SPRAY, METERED NASAL 2 TIMES DAILY
Qty: 1 ML | Refills: 11 | Status: SHIPPED | OUTPATIENT
Start: 2024-10-28

## 2024-10-28 RX ORDER — ALBUTEROL SULFATE 90 UG/1
2 INHALANT RESPIRATORY (INHALATION) EVERY 4 HOURS PRN
Qty: 18 G | Refills: 11 | Status: SHIPPED | OUTPATIENT
Start: 2024-10-28

## 2024-10-28 ASSESSMENT — ENCOUNTER SYMPTOMS
SHORTNESS OF BREATH: 1
CHILLS: 0
FEVER: 0
RHINORRHEA: 1
OCCASIONAL FEELINGS OF UNSTEADINESS: 0
FATIGUE: 0
DEPRESSION: 0
UNEXPECTED WEIGHT CHANGE: 0
LOSS OF SENSATION IN FEET: 0
WHEEZING: 1
COUGH: 1

## 2024-10-28 ASSESSMENT — PATIENT HEALTH QUESTIONNAIRE - PHQ9
SUM OF ALL RESPONSES TO PHQ9 QUESTIONS 1 AND 2: 0
2. FEELING DOWN, DEPRESSED OR HOPELESS: NOT AT ALL
1. LITTLE INTEREST OR PLEASURE IN DOING THINGS: NOT AT ALL

## 2024-10-29 ENCOUNTER — TELEPHONE (OUTPATIENT)
Dept: RADIOLOGY | Facility: HOSPITAL | Age: 59
End: 2024-10-29
Payer: COMMERCIAL

## 2024-10-29 LAB

## 2024-11-01 LAB
A1AT PHENOTYP SERPL-IMP: NORMAL
A1AT SERPL-MCNC: 190 MG/DL (ref 90–200)

## 2024-11-18 ENCOUNTER — HOSPITAL ENCOUNTER (OUTPATIENT)
Dept: RADIOLOGY | Facility: CLINIC | Age: 59
Discharge: HOME | End: 2024-11-18
Payer: COMMERCIAL

## 2024-11-18 DIAGNOSIS — F17.210 CIGARETTE SMOKER: ICD-10-CM

## 2024-11-18 PROCEDURE — 71250 CT THORAX DX C-: CPT

## 2024-11-18 PROCEDURE — 76380 CAT SCAN FOLLOW-UP STUDY: CPT | Performed by: RADIOLOGY

## 2024-11-19 ENCOUNTER — TELEPHONE (OUTPATIENT)
Dept: PULMONOLOGY | Facility: HOSPITAL | Age: 59
End: 2024-11-19
Payer: COMMERCIAL

## 2024-11-19 DIAGNOSIS — J06.9 UPPER RESPIRATORY TRACT INFECTION, UNSPECIFIED TYPE: ICD-10-CM

## 2024-11-19 DIAGNOSIS — F17.210 CIGARETTE SMOKER: Primary | ICD-10-CM

## 2024-11-19 RX ORDER — CEFDINIR 300 MG/1
300 CAPSULE ORAL 2 TIMES DAILY
Qty: 14 CAPSULE | Refills: 0 | Status: SHIPPED | OUTPATIENT
Start: 2024-11-19 | End: 2024-11-26

## 2024-11-19 NOTE — TELEPHONE ENCOUNTER
Patient acknowledged understanding. All questions answered at this time.     ----- Message from Lissette Carranza sent at 11/19/2024 11:53 AM EST -----  I sent her Cefdinir BID x 1 week in place of the Doxy.  ----- Message -----  From: Lori Carrillo RN  Sent: 11/19/2024  11:43 AM EST  To: HORACE Brady    Patient is agreeable for this. [facility].  ----- Message -----  From: HORACE Brady  Sent: 11/19/2024  11:19 AM EST  To: Lori Carrillo RN    Maybe her PCP did not realize I had already given Doxycycline I would actually recommend giving her Cefdinir twice a day for a week and not take the Doxy. If she is agreeable I will send this. I want to cover her for a different type of pneumonia with this antibiotic.  ----- Message -----  From: Lori Carrillo RN  Sent: 11/19/2024  11:15 AM EST  To: HORACE Brady    Patient was in fact not feeling better. She saw her PCP yesterday and they gave her doxycycline and more prednisone.  ----- Message -----  From: HORACE Brady  Sent: 11/19/2024  10:42 AM EST  To: Lori Carrillo RN    Please call the patient and let her know that the prior nodule on her CT chest has resolved. She has new ground glass opacities that appear to be inflammatory, I had treated her for antibiotics when I saw her, this is likely just a result of that infection. Can you ask her if she is feeling better? We will repeat this in 3 month, I ordered this for February.

## 2024-11-27 DIAGNOSIS — J44.9 CHRONIC OBSTRUCTIVE PULMONARY DISEASE, UNSPECIFIED COPD TYPE (MULTI): Primary | ICD-10-CM

## 2024-11-27 RX ORDER — BUDESONIDE AND FORMOTEROL FUMARATE DIHYDRATE 160; 4.5 UG/1; UG/1
2 AEROSOL RESPIRATORY (INHALATION)
Qty: 10.2 G | Refills: 11 | Status: SHIPPED | OUTPATIENT
Start: 2024-11-27

## 2024-12-02 ENCOUNTER — APPOINTMENT (OUTPATIENT)
Dept: RESPIRATORY THERAPY | Facility: HOSPITAL | Age: 59
End: 2024-12-02
Payer: COMMERCIAL

## 2025-01-17 ENCOUNTER — TELEPHONE (OUTPATIENT)
Dept: RADIOLOGY | Facility: HOSPITAL | Age: 60
End: 2025-01-17
Payer: COMMERCIAL

## 2025-01-17 NOTE — TELEPHONE ENCOUNTER
Call placed to the patient in an effort to schedule her for her follow up CT of the chest. Phone was answered and then abruptly hung up. Will send epic message.

## 2025-01-22 ENCOUNTER — TELEPHONE (OUTPATIENT)
Dept: PULMONOLOGY | Facility: CLINIC | Age: 60
End: 2025-01-22
Payer: COMMERCIAL

## 2025-01-28 ENCOUNTER — APPOINTMENT (OUTPATIENT)
Dept: PULMONOLOGY | Facility: HOSPITAL | Age: 60
End: 2025-01-28
Payer: COMMERCIAL

## 2025-02-05 ENCOUNTER — HOSPITAL ENCOUNTER (OUTPATIENT)
Dept: RESPIRATORY THERAPY | Facility: HOSPITAL | Age: 60
Discharge: HOME | End: 2025-02-05
Payer: COMMERCIAL

## 2025-02-05 DIAGNOSIS — F17.210 CIGARETTE SMOKER: ICD-10-CM

## 2025-02-05 LAB
MGC ASCENT PFT - FEV1 - POST: 2.42
MGC ASCENT PFT - FEV1 - PRE: 2.41
MGC ASCENT PFT - FEV1 - PREDICTED: 2.49
MGC ASCENT PFT - FVC - POST: 3.14
MGC ASCENT PFT - FVC - PRE: 3.32
MGC ASCENT PFT - FVC - PREDICTED: 3.12

## 2025-02-05 PROCEDURE — 2500000001 HC RX 250 WO HCPCS SELF ADMINISTERED DRUGS (ALT 637 FOR MEDICARE OP): Performed by: NURSE PRACTITIONER

## 2025-02-05 PROCEDURE — 94640 AIRWAY INHALATION TREATMENT: CPT

## 2025-02-05 PROCEDURE — 94726 PLETHYSMOGRAPHY LUNG VOLUMES: CPT

## 2025-02-05 RX ORDER — ALBUTEROL SULFATE 90 UG/1
2 INHALANT RESPIRATORY (INHALATION) ONCE
Status: COMPLETED | OUTPATIENT
Start: 2025-02-05 | End: 2025-02-05

## 2025-02-05 RX ADMIN — ALBUTEROL SULFATE 2 PUFF: 90 AEROSOL, METERED RESPIRATORY (INHALATION) at 08:02

## 2025-02-06 ENCOUNTER — TELEPHONE (OUTPATIENT)
Dept: PULMONOLOGY | Facility: HOSPITAL | Age: 60
End: 2025-02-06
Payer: COMMERCIAL

## 2025-02-06 NOTE — TELEPHONE ENCOUNTER
Patient acknowledged understanding. All questions answered at this time.     ----- Message from Lissette Carranza sent at 2/6/2025 11:15 AM EST -----  Please call the patient and let her know that her PFTs are normal which is great, recommend continue current plan of care.

## 2025-02-19 ENCOUNTER — HOSPITAL ENCOUNTER (OUTPATIENT)
Dept: RADIOLOGY | Facility: CLINIC | Age: 60
Discharge: HOME | End: 2025-02-19
Payer: COMMERCIAL

## 2025-02-19 DIAGNOSIS — F17.210 CIGARETTE SMOKER: ICD-10-CM

## 2025-02-19 PROCEDURE — 71250 CT THORAX DX C-: CPT

## 2025-02-19 PROCEDURE — 76380 CAT SCAN FOLLOW-UP STUDY: CPT | Performed by: STUDENT IN AN ORGANIZED HEALTH CARE EDUCATION/TRAINING PROGRAM

## 2025-02-21 ENCOUNTER — OFFICE VISIT (OUTPATIENT)
Dept: PULMONOLOGY | Facility: HOSPITAL | Age: 60
End: 2025-02-21
Payer: COMMERCIAL

## 2025-02-21 VITALS
DIASTOLIC BLOOD PRESSURE: 86 MMHG | TEMPERATURE: 97.7 F | SYSTOLIC BLOOD PRESSURE: 134 MMHG | RESPIRATION RATE: 16 BRPM | HEART RATE: 79 BPM | OXYGEN SATURATION: 99 % | WEIGHT: 155.7 LBS | HEIGHT: 65 IN | BODY MASS INDEX: 25.94 KG/M2

## 2025-02-21 DIAGNOSIS — J44.9 CHRONIC OBSTRUCTIVE PULMONARY DISEASE, UNSPECIFIED COPD TYPE (MULTI): Primary | ICD-10-CM

## 2025-02-21 DIAGNOSIS — F17.210 CIGARETTE SMOKER: ICD-10-CM

## 2025-02-21 DIAGNOSIS — J30.9 ALLERGIC RHINITIS, UNSPECIFIED SEASONALITY, UNSPECIFIED TRIGGER: ICD-10-CM

## 2025-02-21 PROCEDURE — 3008F BODY MASS INDEX DOCD: CPT | Performed by: NURSE PRACTITIONER

## 2025-02-21 PROCEDURE — 99213 OFFICE O/P EST LOW 20 MIN: CPT | Performed by: NURSE PRACTITIONER

## 2025-02-21 ASSESSMENT — ENCOUNTER SYMPTOMS
FATIGUE: 0
UNEXPECTED WEIGHT CHANGE: 0
CHILLS: 0
WHEEZING: 1
FEVER: 0
RHINORRHEA: 1
LOSS OF SENSATION IN FEET: 0
OCCASIONAL FEELINGS OF UNSTEADINESS: 0
COUGH: 1
DEPRESSION: 0
SHORTNESS OF BREATH: 1

## 2025-02-21 ASSESSMENT — PATIENT HEALTH QUESTIONNAIRE - PHQ9
2. FEELING DOWN, DEPRESSED OR HOPELESS: NOT AT ALL
1. LITTLE INTEREST OR PLEASURE IN DOING THINGS: NOT AT ALL
SUM OF ALL RESPONSES TO PHQ9 QUESTIONS 1 AND 2: 0

## 2025-02-21 ASSESSMENT — COLUMBIA-SUICIDE SEVERITY RATING SCALE - C-SSRS
1. IN THE PAST MONTH, HAVE YOU WISHED YOU WERE DEAD OR WISHED YOU COULD GO TO SLEEP AND NOT WAKE UP?: NO
6. HAVE YOU EVER DONE ANYTHING, STARTED TO DO ANYTHING, OR PREPARED TO DO ANYTHING TO END YOUR LIFE?: NO
2. HAVE YOU ACTUALLY HAD ANY THOUGHTS OF KILLING YOURSELF?: NO

## 2025-02-21 NOTE — PROGRESS NOTES
Subjective   Patient ID: Talia Wilcox is a 59 y.o. female who presents for follow up for lung nodules.     HPI: Patient has PMH of CAD, SVT, and HLD. She was referred for lung nodules on LDCT. She states that ordinarily she does not get shortness of breath or have a cough on a daily basis. She does work in an elementary school and starting on Friday has had a productive cough, shortness of breath, and wheezing. She does have a low grade fever. She gets seasonal allergies but not currently taking anything for this. She does not typically hear herself wheezing either. She is a current smoker at 1/4-1/2 ppd and has smoked x 40 years. She has worked in a factory in the past and exposed to second hand smoke all throughout her life. She denies any other concerns.     Today she is here for follow up. She states that her breathing and cough have significantly improved since starting on Symbicort and after antibiotics and steroids. She is finally feeling back to baseline. She is not needing albuterol. Allergic rhinitis is also under control. She is smoking at 1/2 ppd. She has no other concerns.    Review of Systems   Constitutional:  Negative for chills, fatigue, fever and unexpected weight change.   HENT:  Positive for congestion and rhinorrhea. Negative for postnasal drip.    Respiratory:  Positive for cough (denies hemoptysis.), shortness of breath and wheezing.    Cardiovascular:  Negative for chest pain and leg swelling.   All other systems reviewed and are negative.      Objective   Physical Exam  Vitals reviewed.   Constitutional:       Appearance: Normal appearance.   HENT:      Head: Normocephalic.   Cardiovascular:      Rate and Rhythm: Normal rate and regular rhythm.   Pulmonary:      Effort: Pulmonary effort is normal.      Breath sounds: Wheezing (faint) present.   Skin:     General: Skin is warm and dry.   Neurological:      Mental Status: She is alert.         Assessment/Plan   COPD  Nicotine  dependence  Allergic rhinitis    Plan:    -Continue on Symbicort, discussed with complete smoking cessation we can consider albuterol alone prn.   -Continue albuterol prn.   -PFTs done and were normal.   -AAT normal.  -She is a current smoker at 1/4-1/2 ppd and has smoked x 40 years.   -She had LDCT in May 2024 that showed mild emphysema and a few scattered nodules up to 7 mm, there has been no comparison other than 2015 so follow up is recommended in 6 months, I ordered this for mid/end of November. This was done and showed interval appearance of multiple ground glass and tree in bud opacities so we repeated again in 3 months. This was done 2/19/25 and showed resolution of opacities and she is back to baseline. Recommendation is now to continue annual screening February 2026, will order on follow up.   -IGE/RAST normal.  -Continue Loratadine one tablet daily for sinus drainage.  -Continue Azelastine nasal spray for sinus drainage. Discussed she can take these prn in the future.     Overall her breathing is at baseline will continue current regimen. I will see her back in November and order follow up LDCT at that time. I instructed patient to call sooner if needed.      Total time:  25 min.

## 2025-02-21 NOTE — PATIENT INSTRUCTIONS
Continue on Symbicort twice a day as discussed.   Continue albuterol inhaler as needed.  Continue Loratadine and Azelastine as discussed.   Call with any questions or concerns.   Follow up with me in early November.

## 2025-02-25 ENCOUNTER — TELEPHONE (OUTPATIENT)
Dept: CARDIOLOGY | Facility: HOSPITAL | Age: 60
End: 2025-02-25
Payer: COMMERCIAL

## 2025-02-25 NOTE — TELEPHONE ENCOUNTER
RN called and spoke with patient to review home medication list, patient at work and will bring a current medication list to upcoming appointment with Dr. Hopper.

## 2025-02-27 ENCOUNTER — APPOINTMENT (OUTPATIENT)
Dept: CARDIOLOGY | Facility: HOSPITAL | Age: 60
End: 2025-02-27
Payer: COMMERCIAL

## 2025-02-27 VITALS
HEIGHT: 65 IN | DIASTOLIC BLOOD PRESSURE: 74 MMHG | WEIGHT: 155 LBS | OXYGEN SATURATION: 98 % | SYSTOLIC BLOOD PRESSURE: 116 MMHG | BODY MASS INDEX: 25.83 KG/M2 | HEART RATE: 89 BPM

## 2025-02-27 DIAGNOSIS — E78.5 HYPERLIPIDEMIA, UNSPECIFIED HYPERLIPIDEMIA TYPE: ICD-10-CM

## 2025-02-27 DIAGNOSIS — I10 ESSENTIAL (PRIMARY) HYPERTENSION: ICD-10-CM

## 2025-02-27 DIAGNOSIS — F17.200 SMOKER: ICD-10-CM

## 2025-02-27 DIAGNOSIS — I25.10 CORONARY ARTERY CALCIFICATION: Primary | ICD-10-CM

## 2025-02-27 LAB
ATRIAL RATE: 89 BPM
P AXIS: 76 DEGREES
P OFFSET: 198 MS
P ONSET: 149 MS
PR INTERVAL: 146 MS
Q ONSET: 222 MS
QRS COUNT: 14 BEATS
QRS DURATION: 78 MS
QT INTERVAL: 360 MS
QTC CALCULATION(BAZETT): 438 MS
QTC FREDERICIA: 410 MS
R AXIS: 82 DEGREES
T AXIS: 61 DEGREES
T OFFSET: 402 MS
VENTRICULAR RATE: 89 BPM

## 2025-02-27 PROCEDURE — 3074F SYST BP LT 130 MM HG: CPT | Performed by: STUDENT IN AN ORGANIZED HEALTH CARE EDUCATION/TRAINING PROGRAM

## 2025-02-27 PROCEDURE — 93005 ELECTROCARDIOGRAM TRACING: CPT | Performed by: STUDENT IN AN ORGANIZED HEALTH CARE EDUCATION/TRAINING PROGRAM

## 2025-02-27 PROCEDURE — 3008F BODY MASS INDEX DOCD: CPT | Performed by: STUDENT IN AN ORGANIZED HEALTH CARE EDUCATION/TRAINING PROGRAM

## 2025-02-27 PROCEDURE — 99215 OFFICE O/P EST HI 40 MIN: CPT | Performed by: STUDENT IN AN ORGANIZED HEALTH CARE EDUCATION/TRAINING PROGRAM

## 2025-02-27 PROCEDURE — 93010 ELECTROCARDIOGRAM REPORT: CPT | Performed by: STUDENT IN AN ORGANIZED HEALTH CARE EDUCATION/TRAINING PROGRAM

## 2025-02-27 PROCEDURE — 99215 OFFICE O/P EST HI 40 MIN: CPT | Mod: 25 | Performed by: STUDENT IN AN ORGANIZED HEALTH CARE EDUCATION/TRAINING PROGRAM

## 2025-02-27 PROCEDURE — 3078F DIAST BP <80 MM HG: CPT | Performed by: STUDENT IN AN ORGANIZED HEALTH CARE EDUCATION/TRAINING PROGRAM

## 2025-02-27 RX ORDER — NAPROXEN SODIUM 220 MG/1
81 TABLET, FILM COATED ORAL DAILY
Qty: 30 TABLET | Refills: 11 | Status: SHIPPED | OUTPATIENT
Start: 2025-02-27 | End: 2026-02-27

## 2025-02-27 RX ORDER — METOPROLOL TARTRATE 25 MG/1
25 TABLET, FILM COATED ORAL 2 TIMES DAILY
Qty: 60 TABLET | Refills: 11 | Status: SHIPPED | OUTPATIENT
Start: 2025-02-27 | End: 2026-02-27

## 2025-02-27 RX ORDER — ATORVASTATIN CALCIUM 40 MG/1
40 TABLET, FILM COATED ORAL DAILY
Qty: 30 TABLET | Refills: 11 | Status: SHIPPED | OUTPATIENT
Start: 2025-02-27 | End: 2026-02-27

## 2025-02-27 NOTE — PROGRESS NOTES
[facility] Note    Date: 02/27/25  Time: 3:48 PM    Subjective     Talia Wilcox is a 59 y.o. female who is coming to clinic for follow-up visit..  Patient has a history of hyperlipidemia, prediabetes, abnormal coronary artery calcification.  Patient is reporting some palpitations.  CT chest showed severe coronary artery calcification.  Patient was referred due to palpitation.  Patient had CT scan done for screening of lung cancer which showed severe coronary artery calcification. Patient denies any heart failure symptoms.  She has been having palpitations.  She underwent Holter monitoring which showed some SVT and supraventricular ectopy some of which corresponded with patient's symptoms.  Due to reported chest tightness we did obtain a stress test which was unremarkable.  Patient does have emphysema noted on CAT scan and follows with pulmonary for COPD.  She continues to smoke.     She reports after starting inhaler her breathing is improved.     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           Review of Systems:  Otherwise, limited cardiovascular review of systems is negative.        Medical History:   She has a past medical history of Crohn disease (Multi).  Surgical History:   No past surgical history on file.PSHP@  Social History:   Social Drivers of Health with Concerns     Tobacco Use: High Risk (2/27/2025)    Patient History     Smoking Tobacco Use: Every Day     Smokeless Tobacco Use: Never     Passive Exposure: Not on file   Alcohol Use: Not on file   Financial Resource Strain: Not on file   Food Insecurity: Not on file   Transportation Needs: Not on file   Physical Activity: Not on file   Stress: Not on file   Social Connections: Not on file   Intimate Partner Violence: Not on file   Housing Stability: Not on file  "  Utilities: Not on file   Digital Equity: Not on file   Health Literacy: Not on file     Family History:   Family History   Problem Relation Name Age of Onset    Heart attack Father Branch Givens       Allergies:  Ciprofloxacin    Outpatient Medications:  Current Outpatient Medications   Medication Instructions    albuterol 90 mcg/actuation inhaler 2 puffs, inhalation, Every 4 hours PRN    aspirin 81 mg, oral, Daily    atorvastatin (LIPITOR) 40 mg, oral, Daily    azelastine (Astelin) 137 mcg (0.1 %) nasal spray 1 spray, Each Nostril, 2 times daily, Use in each nostril as directed    budesonide-formoteroL (Symbicort) 160-4.5 mcg/actuation inhaler 2 puffs, inhalation, 2 times daily RT, Rinse mouth with water after use to reduce aftertaste and incidence of candidiasis. Do not swallow.    cholecalciferol (VITAMIN D-3) 1,000 Units, Daily RT    cyanocobalamin (VITAMIN B-12) 250 mcg, Daily RT    loratadine (CLARITIN) 10 mg, oral, Daily    metoprolol tartrate (LOPRESSOR) 25 mg, oral, 2 times daily       Objective     Physical Exam  Vitals:    02/27/25 1507   BP: 116/74   BP Location: Left arm   Patient Position: Sitting   BP Cuff Size: Adult   Pulse: 89   SpO2: 98%   Weight: 70.3 kg (155 lb)   Height: 1.651 m (5' 5\")     Wt Readings from Last 3 Encounters:   02/27/25 70.3 kg (155 lb)   02/21/25 70.6 kg (155 lb 11.2 oz)   10/28/24 64.5 kg (142 lb 3.2 oz)         General: Alert and Oriented, No distress, cooperative  Head: Normocephalic without obvious abnormality, atraumatic  Eyes: Conjunctiva/corneas clear, EOM's grossly intact  Neck: Supple, trachea midline, No thyroid enlargement/tenderness/nodules; No JVD  Lungs: Clear to auscultation bilaterally, no wheezes, rhonci, or rales. respirations unlabored  Chest Wall: No tenderness or deformity  Heart: Regular rhythm, normal S1/S2, no murmur  Abdomen: Soft, non-tender, Non-distended, bowel sounds active  Extremities: No edema, no cyanosis, no clubbing  Skin: Skin color, " texture, turgor normal.  No rashes or lesions noted  Neurologic: Alert and oriented x 3, grossly moving all extremities, speech intact        I have personally reviewed the following images and laboratory findings:  ECG: normal sinus rhythm, no blocks or conduction defects, no ischemic changes    INDICATION:  Signs/Symptoms:..      COMPARISON:  12/16/2015      ACCESSION NUMBER(S):  [id number]      ORDERING CLINICIAN:  JEFF MCNEAL      TECHNIQUE:  Helical data acquisition of the chest was obtained without IV  contrast material.  Images were reformatted in axial, coronal, and  sagittal planes.      FINDINGS:  LUNGS AND AIRWAYS:  The trachea and central airways are patent. No endobronchial lesion  is seen.      There is mild paraseptal emphysematous changes.  There is prominent biapical pleural thickening with nodularity.  There is no focal consolidation, pleural effusion, or pneumothorax.      There is a 7 mm nodular density in the left upper lobe with adjacent  ground-glass opacities. 3 mm subpleural right apical parenchymal lung  nodule. There is a 3 mm subpleural left upper lobe parenchymal lung  nodule. There is a 2 mm subpleural right lower lobe parenchymal lung  nodule. Punctate 2 mm right middle lobe parenchymal lung nodules.                  MEDIASTINUM AND ANTWAN, LOWER NECK AND AXILLA:  The visualized thyroid gland is within normal limits.  No evidence of thoracic lymphadenopathy by CT criteria.  Esophagus appears within normal limits as seen.      HEART AND VESSELS:  The thoracic aorta normal in course and caliber.  Main pulmonary artery and its branches are normal in caliber.  There are severe coronary artery calcifications. Estimated CT calcium  score: 583. The cardiac chambers are not enlarged.  There is no pericardial effusion seen.      UPPER ABDOMEN:  Patient is status post cholecystectomy.              CHEST WALL AND OSSEOUS STRUCTURES:  Chest wall is within normal limits.  No acute osseous  pathology.There are no suspicious osseous lesions.      IMPRESSION:  1. There are several subcentimeter parenchymal lung nodules most  likely benign in nature.  2. Recommend a six-month low-dose chest CT confirm the benign nature  of these subcentimeter nodules.  3. Severe coronary artery calcifications and correlate with coronary  artery disease risk factors.  TRANSTHORACIC ECHOCARDIOGRAM REPORT     Patient Name:      ABRAHAM MART JONNY       Reading Physician:    [id number] Kim Nelson MD  Study Date:        7/23/2024            Ordering Provider:    [id number] YULIANA MITCHELL  MRN/PID:           [id number]             Fellow:  Accession#:        [id number]         Nurse:  Date of Birth/Age: 1965 / 58      Sonographer:          Chasity Gonzalez RDCS                     years  Gender:            F                    Additional Staff:     Jolie Luna  Height:            165.10 cm            Admit Date:           7/23/2024  Weight:            62.14 kg             Admission Status:     Outpatient  BSA / BMI:         1.68 m2 / 22.80      Department Location:  [facility]                     kg/m2                                      Lab  Blood Pressure: 142 /84 mmHg     Study Type:    TRANSTHORACIC ECHO (TTE) COMPLETE  Diagnosis/ICD: Essential (primary) hypertension-I10  Indication:    Hypertension  CPT Codes:     Echo Complete w Full Doppler-63578   Study Detail: The following Echo studies were performed: 2D, M-Mode, Doppler,                color flow and Strain.        PHYSICIAN INTERPRETATION:  Left Ventricle: Left ventricular ejection fraction is normal, with a calculated ejection fraction of 56% by auto EF. There are no regional wall motion abnormalities. The left ventricular cavity size is normal. The left ventricular septal wall thickness is normal. Left Ventricular Global Longitudinal  Strain - 18.2 %. Spectral Doppler shows a normal pattern of left ventricular diastolic filling.  Left Atrium: The left atrium is normal in size.  Right Ventricle: The right ventricle is normal in size. There is normal right ventricular global systolic function.  Right Atrium: The right atrium is normal in size.  Aortic Valve: The aortic valve appears structurally normal. The aortic valve dimensionless index is 1.00. There is no evidence of aortic valve regurgitation. The peak instantaneous gradient of the aortic valve is 3.4 mmHg. The mean gradient of the aortic valve is 1.9 mmHg.  Mitral Valve: The mitral valve is normal in structure. There is no evidence of mitral valve regurgitation.  Tricuspid Valve: The tricuspid valve is structurally normal. No evidence of tricuspid regurgitation.  Pulmonic Valve: The pulmonic valve is structurally normal. There is no indication of pulmonic valve regurgitation.  Pericardium: There is no pericardial effusion noted.  Aorta: The aortic root is normal.        CONCLUSIONS:   1. Left ventricular ejection fraction is normal, with a calculated ejection fraction of 56% by auto EF.   2. There is normal right ventricular global systolic function.    STUDY:  NUCLEAR STRESS TEST; 9/5/2024 9:59 am      INDICATION:  Signs/Symptoms:chest tightness, abnormal coronary calcification.      COMPARISON:  None.     ...   Impression   1. Normal perfusion without evidence of ischemia or prior infarct      2. Calculated ejection fraction greater than 70% without segmental  wall motion abnormality seen.            [place]      Laboratory values:   Hospital Outpatient Visit on 02/05/2025   Component Date Value    FVC - Predicted 02/05/2025 3.12     FEV1 - Predicted 02/05/2025 2.49     FVC - PRE 02/05/2025 3.32     FEV1 - Pre 02/05/2025 2.41     FVC - Post 02/05/2025 3.14     FEV1 - Post 02/05/2025 2.42      CBC -  Lab Results   Component Value Date    WBC 10.1 02/06/2022    HGB 11.9 (L) 02/06/2022    HCT  "35.7 (L) 02/06/2022    MCV 94 02/06/2022     02/06/2022       CMP -  Lab Results   Component Value Date    CALCIUM 8.8 02/06/2022    PHOS 4.1 02/06/2022    PROT 7.3 02/05/2022    ALBUMIN 3.7 02/06/2022    AST 16 02/05/2022    ALT 16 02/05/2022    ALKPHOS 83 02/05/2022    BILITOT 0.3 02/05/2022       LIPID PANEL -   No results found for: \"CHOL\", \"HDL\", \"CHHDL\", \"LDL\", \"VLDL\", \"TRIG\", \"NHDL\"    RENAL FUNCTION PANEL -   Lab Results   Component Value Date    K 3.8 02/06/2022    PHOS 4.1 02/06/2022       No results found for: \"BNP\", \"HGBA1C\"     Assessment/Plan   Palpitation/supraventricular tachycardia/SVE  Hyperlipidemia  Abnormal coronary artery calcification  Smoking     Plan:  -I reviewed the results of stress testing with the patient which was low risk test.  Previous echo and Holter were reviewed.  -I will continue on aspirin 81 mg daily and  high intensity statin therapy.  -I will check lipid panel, LP(a), CBC and BMP.  -I will continue on low-dose metoprolol as taking.  -Continue follow-up with pulmonary for COPD.  -Encourage smoking cessation.    RTC as scheduled  In addition, the following orders were placed today:  Orders Placed This Encounter   Procedures    Lipid panel    Lipoprotein a    Basic metabolic panel    CBC    ECG 12 Lead                 SIGNATURE: Reji Hopper MD PATIENT NAME: Talia Wilcox   DATE/TIME: February 27, 2025 3:48 PM MRN: [id number]                             "

## 2025-03-11 LAB
ANION GAP SERPL CALCULATED.4IONS-SCNC: 10 MMOL/L (CALC) (ref 7–17)
BUN SERPL-MCNC: 12 MG/DL (ref 7–25)
BUN/CREAT SERPL: NORMAL (CALC) (ref 6–22)
CALCIUM SERPL-MCNC: 9.4 MG/DL (ref 8.6–10.4)
CHLORIDE SERPL-SCNC: 108 MMOL/L (ref 98–110)
CO2 SERPL-SCNC: 24 MMOL/L (ref 20–32)
CREAT SERPL-MCNC: 0.71 MG/DL (ref 0.5–1.03)
EGFRCR SERPLBLD CKD-EPI 2021: 98 ML/MIN/1.73M2
ERYTHROCYTE [DISTWIDTH] IN BLOOD BY AUTOMATED COUNT: 13.4 % (ref 11–15)
GLUCOSE SERPL-MCNC: 85 MG/DL (ref 65–99)
HCT VFR BLD AUTO: 44 % (ref 35–45)
HGB BLD-MCNC: 14.2 G/DL (ref 11.7–15.5)
MCH RBC QN AUTO: 28.9 PG (ref 27–33)
MCHC RBC AUTO-ENTMCNC: 32.3 G/DL (ref 32–36)
MCV RBC AUTO: 89.6 FL (ref 80–100)
PLATELET # BLD AUTO: 220 THOUSAND/UL (ref 140–400)
PMV BLD REES-ECKER: 11.5 FL (ref 7.5–12.5)
POTASSIUM SERPL-SCNC: 4.4 MMOL/L (ref 3.5–5.3)
RBC # BLD AUTO: 4.91 MILLION/UL (ref 3.8–5.1)
SODIUM SERPL-SCNC: 142 MMOL/L (ref 135–146)
WBC # BLD AUTO: 6.4 THOUSAND/UL (ref 3.8–10.8)

## 2025-03-12 ENCOUNTER — TELEPHONE (OUTPATIENT)
Dept: CARDIOLOGY | Facility: HOSPITAL | Age: 60
End: 2025-03-12
Payer: COMMERCIAL

## 2025-03-12 NOTE — TELEPHONE ENCOUNTER
----- Message from Reji Hopper sent at 3/11/2025  7:19 PM EDT -----  routine  ----- Message -----  From: Jens LayerBoom Results In  Sent: 3/11/2025  12:36 AM EDT  To: Reji Hopper MD

## 2025-03-12 NOTE — TELEPHONE ENCOUNTER
RN called patient, no answer, left message for patient to return call at [phone] to review lab results.

## 2025-03-13 ENCOUNTER — TELEPHONE (OUTPATIENT)
Dept: CARDIOLOGY | Facility: HOSPITAL | Age: 60
End: 2025-03-13
Payer: COMMERCIAL

## 2025-03-13 LAB
CHOLEST SERPL-MCNC: 180 MG/DL
CHOLEST/HDLC SERPL: 2.6 (CALC)
HDLC SERPL-MCNC: 68 MG/DL
LDLC SERPL CALC-MCNC: 93 MG/DL (CALC)
LPA SERPL-SCNC: 263 NMOL/L
NONHDLC SERPL-MCNC: 112 MG/DL (CALC)
TRIGL SERPL-MCNC: 99 MG/DL

## 2025-03-13 NOTE — TELEPHONE ENCOUNTER
----- Message from Reji Hopper sent at 3/11/2025  7:19 PM EDT -----  routine  ----- Message -----  From: Jens OssDsign AB Results In  Sent: 3/11/2025  12:36 AM EDT  To: Reji Hopper MD

## 2025-03-13 NOTE — TELEPHONE ENCOUNTER
RN called and spoke with patient, notified that Dr. Hopper reviewed the CBC. BMP ,and lipids, keep routine follow up as scheduled. Patient verbalized understanding and is agreeable to follow up as scheduled.

## 2025-03-14 ENCOUNTER — TELEPHONE (OUTPATIENT)
Dept: CARDIOLOGY | Facility: HOSPITAL | Age: 60
End: 2025-03-14
Payer: COMMERCIAL

## 2025-03-14 DIAGNOSIS — E78.5 HYPERLIPIDEMIA, UNSPECIFIED HYPERLIPIDEMIA TYPE: Primary | ICD-10-CM

## 2025-03-14 DIAGNOSIS — I25.10 CORONARY ARTERY CALCIFICATION: ICD-10-CM

## 2025-03-14 RX ORDER — ATORVASTATIN CALCIUM 80 MG/1
80 TABLET, FILM COATED ORAL DAILY
Qty: 30 TABLET | Refills: 11 | Status: SHIPPED | OUTPATIENT
Start: 2025-03-14 | End: 2026-03-14

## 2025-03-14 NOTE — TELEPHONE ENCOUNTER
----- Message from Reji Hopper sent at 3/13/2025 10:33 PM EDT -----  Very high lipoprotein levels. Increase atorvastatin to 80 mg at bedtime and repeat levels of lipoprition at 3 months.  ----- Message -----  From: [facility] Results In  Sent: 3/11/2025  12:36 AM EDT  To: Reji Hopper MD

## 2025-03-14 NOTE — TELEPHONE ENCOUNTER
RN called patient, no answer, left message for patient to return call at [phone] to review lab results.

## 2025-03-14 NOTE — TELEPHONE ENCOUNTER
Patient called office to return our call. Patient was informed we will call back when able. Patient asks we call back after 3 so she will be out of work to discuss.

## 2025-03-14 NOTE — TELEPHONE ENCOUNTER
RN returned patient call and spoke with patient, notified of elevated lipoprotein levels and Dr. Hopper's recommendation to increase atorvastatin to 80 mg at bedtime and repeat levels of lipoprition at 3 months. Patient verbalized understanding and is agreeable to plan.     Orders placed for labs and atorvastatin pended to Dr. Hopper

## 2025-05-30 ENCOUNTER — TELEPHONE (OUTPATIENT)
Facility: CLINIC | Age: 60
End: 2025-05-30
Payer: COMMERCIAL

## 2025-05-30 NOTE — TELEPHONE ENCOUNTER
Received faxed referral from Laurence Rojas CNP for Crohns.  Left a message for patient to call back to schedule established visit with Jeff.  He did colonoscopy on 6/17/24

## 2025-06-14 DIAGNOSIS — E78.5 HYPERLIPIDEMIA, UNSPECIFIED HYPERLIPIDEMIA TYPE: ICD-10-CM

## 2025-06-14 DIAGNOSIS — I25.10 CORONARY ARTERY CALCIFICATION: ICD-10-CM

## 2025-06-30 ENCOUNTER — APPOINTMENT (OUTPATIENT)
Facility: CLINIC | Age: 60
End: 2025-06-30
Payer: COMMERCIAL

## 2025-07-30 ENCOUNTER — HOSPITAL ENCOUNTER (OUTPATIENT)
Dept: RADIOLOGY | Facility: CLINIC | Age: 60
Discharge: HOME | End: 2025-07-30
Payer: COMMERCIAL

## 2025-07-30 DIAGNOSIS — N81.10 CYSTOCELE, UNSPECIFIED: ICD-10-CM

## 2025-07-30 DIAGNOSIS — R30.0 DYSURIA: ICD-10-CM

## 2025-07-30 DIAGNOSIS — R80.9 PROTEINURIA, UNSPECIFIED: ICD-10-CM

## 2025-07-30 DIAGNOSIS — R31.29 OTHER MICROSCOPIC HEMATURIA: ICD-10-CM

## 2025-07-30 LAB
CREAT SERPL-MCNC: 0.7 MG/DL (ref 0.6–1.3)
EGFRCR SERPLBLD CKD-EPI 2021: >90 ML/MIN/1.73M*2

## 2025-07-30 PROCEDURE — 76770 US EXAM ABDO BACK WALL COMP: CPT | Performed by: RADIOLOGY

## 2025-07-30 PROCEDURE — 74178 CT ABD&PLV WO CNTR FLWD CNTR: CPT

## 2025-07-30 PROCEDURE — 2550000001 HC RX 255 CONTRASTS

## 2025-07-30 PROCEDURE — 82565 ASSAY OF CREATININE: CPT

## 2025-07-30 PROCEDURE — 76770 US EXAM ABDO BACK WALL COMP: CPT

## 2025-07-30 RX ADMIN — IOHEXOL 75 ML: 350 INJECTION, SOLUTION INTRAVENOUS at 09:23

## 2025-08-06 ENCOUNTER — APPOINTMENT (OUTPATIENT)
Facility: CLINIC | Age: 60
End: 2025-08-06
Payer: COMMERCIAL

## 2025-08-06 VITALS
DIASTOLIC BLOOD PRESSURE: 81 MMHG | BODY MASS INDEX: 25.92 KG/M2 | HEIGHT: 65 IN | SYSTOLIC BLOOD PRESSURE: 154 MMHG | HEART RATE: 76 BPM | OXYGEN SATURATION: 98 % | WEIGHT: 155.6 LBS

## 2025-08-06 DIAGNOSIS — Z12.11 SCREENING FOR COLON CANCER: ICD-10-CM

## 2025-08-06 DIAGNOSIS — K50.00 CROHN'S DISEASE OF SMALL INTESTINE WITHOUT COMPLICATION (MULTI): Primary | ICD-10-CM

## 2025-08-06 PROCEDURE — 99214 OFFICE O/P EST MOD 30 MIN: CPT | Performed by: INTERNAL MEDICINE

## 2025-08-06 PROCEDURE — 3008F BODY MASS INDEX DOCD: CPT | Performed by: INTERNAL MEDICINE

## 2025-08-06 RX ORDER — POLYETHYLENE GLYCOL 3350, SODIUM SULFATE ANHYDROUS, SODIUM BICARBONATE, SODIUM CHLORIDE, POTASSIUM CHLORIDE 236; 22.74; 6.74; 5.86; 2.97 G/4L; G/4L; G/4L; G/4L; G/4L
4 POWDER, FOR SOLUTION ORAL ONCE
Qty: 4000 ML | Refills: 0 | Status: SHIPPED | OUTPATIENT
Start: 2025-08-06 | End: 2025-08-06

## 2025-08-06 RX ORDER — AMITRIPTYLINE HYDROCHLORIDE 25 MG/1
25 TABLET, FILM COATED ORAL NIGHTLY
COMMUNITY

## 2025-08-06 NOTE — PATIENT INSTRUCTIONS
You have been scheduled for a colonoscopy.  You were given instructions for preparing for this test in the office today.  If you have questions about these instructions, please call my office at [phone].    After your procedure, you can expect me to talk to you to go over the results of the procedure. If polyps were removed I will usually be able to tell you my initial thoughts about those polyps and give you some idea of when you should have another colonoscopy.    If any polyps are removed during your procedure or if any biopsies are obtained those specimens will go to the pathologists to review under the microscope. Once those results are available they will be sent to me electronically to review. These results will also be available to you at that time through [employer]. I briefly review all of these results by the next business day to determine if there is any urgent information that needs to be communicated to you right away or anything that would significantly change what I told you at the time of the procedure. If there is nothing urgent this is usually a good sign and I will then do a more extensive review of the result and send you a letter with my final recommendations within a week of the result becoming available. If you have questions or need additional information I urge you to call the office at [phone], but I do ask for patience as I spend a good portion of each day performing procedures like the one you are scheduled for and during those times I am not able to take or return routine phone calls.    You were also given information regarding the schedule for your procedure including the time that you need to arrive to the endoscopy unit.  You will also be contacted 2-3 day prior to your procedure to confirm the final arrival time.  If you have questions about this or if you need to cancel or change this appointment please call my office at [phone].

## 2025-08-06 NOTE — PROGRESS NOTES
[facility]    ASSESSMENT and PLAN:       Talia Wilcox is a 59 y.o. female with a significant past medical history of Crohn's disease, hyperlipidemia who presents for consultation requested by her primary care provider (Alonzo Edwards MD) for the evaluation of Crohn's disease.       Crohns diease   - last saw a GI in 2012 in [place]   - Hx of right hemicoleoectmy at [facility] in 2004   - Patient was on pentsa for a short time in 2004   - Attempted colonoscopy 6/17/2024 healthy ileocolonic anastomosis in the cecum seen by a large amount of semisolid liquid stool unable to suction due to debris  -CT scan and 2022 showed some thickening and areas of narrowing in the neoterminal ileum  - Will repeat colonoscopy now for further evaluation      Christopher Aguilar DO         Gastroenterology    [facility]            Subjective   HISTORY OF PRESENT ILLNESS:     Chief Complaint  Crohn's Disease (Colon 6/17/24 - insufficient prep.)    History Of Present Illness:    Talia Wilcox is a 59 y.o. female with a significant past medical history of Crohn's disease, hyperlipidemia who presents for consultation requested by her primary care provider (Alonzo Edwards MD) for the evaluation of Crohn's disease.  Patient presents establish care with a GI physician.  Patient was diagnosed with Crohn's disease back in 2004 when she underwent right hemicolectomy for what sounds like a terminal ileum bowel obstruction due to inflammation.  Patient states she last followed with a GI physician in [place] this back in 2012.  On today patient is asymptomatic.  Denies any change in bowel habits denies any abdominal pain.  Overall is no complaints of GI point of view.      Patient denies any heartburn/GERD, N/V, dysphagia, odynophagia, abdominal pain, diarrhea, constipation, hematemesis, hematochezia, melena, or weight loss.      Endoscopy History:  P in June 2024 with  "poor prep unable to see the right side of the colon healthy looking anastomosis      Review of systems:   Review of Systems      I performed a complete 10 point review of systems and it is negative except as noted in HPI or above.        PAST HISTORIES:       Past Medical History:  She has a past medical history of Anxiety, COPD (chronic obstructive pulmonary disease) (Multi), and Crohn disease (Multi).    Past Surgical History:  She has a past surgical history that includes Cholecystectomy; Small intestine surgery; and  section, low transverse.      Social History:  She reports that she has been smoking cigarettes. She started smoking about 7 months ago. She has a 15.6 pack-year smoking history. She has never used smokeless tobacco. She reports that she does not drink alcohol and does not use drugs.    Family History:  No known GI disease, specifically denies pancreatitis, Crohn's, colon cancer, gastroesophageal cancer, or ulcerative colitis.    Family History[1]     Allergies:  Ciprofloxacin        Objective   OBJECTIVE:       Last Recorded Vitals:  Vitals:    25 1018   BP: 154/81   BP Location: Right arm   Patient Position: Sitting   BP Cuff Size: Adult   Pulse: 76   SpO2: 98%   Weight: 70.6 kg (155 lb 9.6 oz)   Height: 1.651 m (5' 5\")     /81 (BP Location: Right arm, Patient Position: Sitting, BP Cuff Size: Adult)   Pulse 76   Ht 1.651 m (5' 5\")   Wt 70.6 kg (155 lb 9.6 oz)   SpO2 98%   BMI 25.89 kg/m²      Physical Exam:    Physical Exam  Vitals reviewed.   Constitutional:       General: She is awake.      Appearance: Normal appearance.   HENT:      Head: Normocephalic.      Mouth/Throat:      Mouth: Mucous membranes are moist.     Eyes:      Extraocular Movements: Extraocular movements intact.       Cardiovascular:      Rate and Rhythm: Normal rate.      Heart sounds: Normal heart sounds.   Pulmonary:      Effort: Pulmonary effort is normal.      Breath sounds: Normal breath sounds. "   Abdominal:      General: Bowel sounds are normal.      Palpations: Abdomen is soft.      Tenderness: There is no abdominal tenderness. There is no guarding or rebound.      Hernia: No hernia is present.     Musculoskeletal:         General: Normal range of motion.      Cervical back: Neck supple.     Skin:     General: Skin is warm and dry.     Neurological:      General: No focal deficit present.      Mental Status: She is alert.     Psychiatric:         Attention and Perception: Attention and perception normal.         Mood and Affect: Mood normal.         Behavior: Behavior normal.             Home Medications:  Prior to Admission medications   Medication Sig Start Date End Date Taking? Authorizing Provider   albuterol 90 mcg/actuation inhaler Inhale 2 puffs every 4 hours if needed for shortness of breath. 10/28/24  Yes HORACE Brady   aspirin 81 mg chewable tablet Chew 1 tablet (81 mg) once daily. 2/27/25 2/27/26 Yes Reji Hopper MD   atorvastatin (Lipitor) 80 mg tablet Take 1 tablet (80 mg) by mouth once daily. 3/14/25 3/14/26 Yes Reji Hopper MD   azelastine (Astelin) 137 mcg (0.1 %) nasal spray Administer 1 spray into each nostril 2 times a day. Use in each nostril as directed 10/28/24  Yes HORACE Brady   budesonide-formoteroL (Symbicort) 160-4.5 mcg/actuation inhaler Inhale 2 puffs 2 times a day. Rinse mouth with water after use to reduce aftertaste and incidence of candidiasis. Do not swallow. 11/27/24  Yes HORACE Brady   cholecalciferol (Vitamin D-3) 25 MCG (1000 UT) capsule Take 1 capsule (1,000 Units) by mouth once daily. 6/3/24  Yes Historical Provider, MD   cyanocobalamin (Vitamin B-12) 250 mcg tablet Take 1 tablet (250 mcg) by mouth once daily. 6/3/24  Yes Historical Provider, MD   loratadine (Claritin) 10 mg tablet Take 1 tablet (10 mg) by mouth once daily. 10/28/24  Yes HORACE Brady   metoprolol tartrate (Lopressor) 25 mg tablet Take 1 tablet (25  "mg) by mouth 2 times a day. 2/27/25 2/27/26 Yes Reji Hopper MD   amitriptyline (Elavil) 25 mg tablet Take 1 tablet (25 mg) by mouth once daily at bedtime.    Historical Provider, MD         Relevant Results Recent labs reviewed in the EMR.  Lab Results   Component Value Date    HGB 14.2 03/10/2025    HGB 11.9 (L) 02/06/2022    HGB 13.4 02/05/2022    MCV 89.6 03/10/2025    MCV 94 02/06/2022    MCV 93 02/05/2022     03/10/2025     02/06/2022     02/05/2022       No results found for: \"FERRITIN\", \"IRON\"    Lab Results   Component Value Date     03/10/2025    K 4.4 03/10/2025     03/10/2025    BUN 12 03/10/2025    CREATININE 0.71 03/10/2025       Lab Results   Component Value Date    BILITOT 0.3 02/05/2022     Lab Results   Component Value Date    ALT 16 02/05/2022    AST 16 02/05/2022    ALKPHOS 83 02/05/2022       Lab Results   Component Value Date    CRP 0.30 02/05/2022       No results found for: \"CALPS\"    Radiology: Reviewed imaging reviewed in the EMR.  Imaging  No results found.    Cardiology, Vascular, and Other Imaging  No other imaging results found for the past 7 days             [1]   Family History  Problem Relation Name Age of Onset    Heart attack Father Hebron Givens     Irritable bowel syndrome Father Hebron Givens     Colon polyps Sister       "

## 2025-08-12 ENCOUNTER — ANESTHESIA EVENT (OUTPATIENT)
Dept: GASTROENTEROLOGY | Facility: HOSPITAL | Age: 60
End: 2025-08-12
Payer: COMMERCIAL

## 2025-08-14 ENCOUNTER — ANESTHESIA (OUTPATIENT)
Dept: GASTROENTEROLOGY | Facility: HOSPITAL | Age: 60
End: 2025-08-14
Payer: COMMERCIAL

## 2025-08-14 ENCOUNTER — HOSPITAL ENCOUNTER (OUTPATIENT)
Dept: GASTROENTEROLOGY | Facility: HOSPITAL | Age: 60
Discharge: HOME | End: 2025-08-14
Payer: COMMERCIAL

## 2025-08-14 VITALS
RESPIRATION RATE: 16 BRPM | OXYGEN SATURATION: 96 % | SYSTOLIC BLOOD PRESSURE: 125 MMHG | HEART RATE: 70 BPM | DIASTOLIC BLOOD PRESSURE: 84 MMHG | TEMPERATURE: 97.2 F

## 2025-08-14 DIAGNOSIS — Z12.11 SCREENING FOR COLON CANCER: ICD-10-CM

## 2025-08-14 PROBLEM — F41.9 ANXIETY: Status: ACTIVE | Noted: 2025-08-14

## 2025-08-14 PROBLEM — I10 PRIMARY HYPERTENSION: Status: ACTIVE | Noted: 2025-08-14

## 2025-08-14 PROBLEM — J44.9 CHRONIC OBSTRUCTIVE PULMONARY DISEASE (MULTI): Status: ACTIVE | Noted: 2025-08-14

## 2025-08-14 PROCEDURE — 3700000002 HC GENERAL ANESTHESIA TIME - EACH INCREMENTAL 1 MINUTE

## 2025-08-14 PROCEDURE — 7100000010 HC PHASE TWO TIME - EACH INCREMENTAL 1 MINUTE

## 2025-08-14 PROCEDURE — 2500000004 HC RX 250 GENERAL PHARMACY W/ HCPCS (ALT 636 FOR OP/ED): Performed by: NURSE ANESTHETIST, CERTIFIED REGISTERED

## 2025-08-14 PROCEDURE — 7100000009 HC PHASE TWO TIME - INITIAL BASE CHARGE

## 2025-08-14 PROCEDURE — 3700000001 HC GENERAL ANESTHESIA TIME - INITIAL BASE CHARGE

## 2025-08-14 PROCEDURE — 45378 DIAGNOSTIC COLONOSCOPY: CPT | Performed by: INTERNAL MEDICINE

## 2025-08-14 PROCEDURE — 2500000004 HC RX 250 GENERAL PHARMACY W/ HCPCS (ALT 636 FOR OP/ED): Performed by: INTERNAL MEDICINE

## 2025-08-14 RX ORDER — PROPOFOL 10 MG/ML
INJECTION, EMULSION INTRAVENOUS AS NEEDED
Status: DISCONTINUED | OUTPATIENT
Start: 2025-08-14 | End: 2025-08-14

## 2025-08-14 RX ORDER — SODIUM CHLORIDE 9 MG/ML
20 INJECTION, SOLUTION INTRAVENOUS CONTINUOUS
Status: ACTIVE | OUTPATIENT
Start: 2025-08-14 | End: 2025-08-14

## 2025-08-14 RX ORDER — LIDOCAINE HYDROCHLORIDE 20 MG/ML
INJECTION, SOLUTION EPIDURAL; INFILTRATION; INTRACAUDAL; PERINEURAL AS NEEDED
Status: DISCONTINUED | OUTPATIENT
Start: 2025-08-14 | End: 2025-08-14

## 2025-08-14 RX ADMIN — SODIUM CHLORIDE 20 ML/HR: 0.9 INJECTION, SOLUTION INTRAVENOUS at 08:20

## 2025-08-14 RX ADMIN — PROPOFOL 50 MG: 10 INJECTION, EMULSION INTRAVENOUS at 08:51

## 2025-08-14 RX ADMIN — LIDOCAINE HYDROCHLORIDE 100 MG: 20 INJECTION, SOLUTION EPIDURAL; INFILTRATION; INTRACAUDAL; PERINEURAL at 08:50

## 2025-08-14 RX ADMIN — PROPOFOL 50 MG: 10 INJECTION, EMULSION INTRAVENOUS at 08:57

## 2025-08-14 RX ADMIN — PROPOFOL 50 MG: 10 INJECTION, EMULSION INTRAVENOUS at 08:58

## 2025-08-14 RX ADMIN — PROPOFOL 50 MG: 10 INJECTION, EMULSION INTRAVENOUS at 09:03

## 2025-08-14 SDOH — HEALTH STABILITY: MENTAL HEALTH: CURRENT SMOKER: 1

## 2025-08-14 ASSESSMENT — PAIN SCALES - GENERAL
PAINLEVEL_OUTOF10: 0 - NO PAIN
PAINLEVEL_OUTOF10: 0 - NO PAIN
PAIN_LEVEL: 0
PAINLEVEL_OUTOF10: 0 - NO PAIN

## 2025-08-14 ASSESSMENT — PAIN - FUNCTIONAL ASSESSMENT
PAIN_FUNCTIONAL_ASSESSMENT: 0-10

## 2025-08-29 ENCOUNTER — APPOINTMENT (OUTPATIENT)
Dept: CARDIOLOGY | Facility: HOSPITAL | Age: 60
End: 2025-08-29
Payer: COMMERCIAL

## 2025-09-09 ENCOUNTER — APPOINTMENT (OUTPATIENT)
Dept: UROLOGY | Facility: CLINIC | Age: 60
End: 2025-09-09
Payer: COMMERCIAL

## 2025-10-21 ENCOUNTER — APPOINTMENT (OUTPATIENT)
Dept: CARDIOLOGY | Facility: HOSPITAL | Age: 60
End: 2025-10-21
Payer: COMMERCIAL